# Patient Record
Sex: FEMALE | Race: WHITE | Employment: UNEMPLOYED | URBAN - METROPOLITAN AREA
[De-identification: names, ages, dates, MRNs, and addresses within clinical notes are randomized per-mention and may not be internally consistent; named-entity substitution may affect disease eponyms.]

---

## 2017-01-04 ENCOUNTER — TREATMENT (OUTPATIENT)
Dept: PHYSICAL THERAPY | Age: 47
End: 2017-01-04

## 2017-01-11 ENCOUNTER — TREATMENT (OUTPATIENT)
Dept: PHYSICAL THERAPY | Age: 47
End: 2017-01-11

## 2017-01-20 ENCOUNTER — TREATMENT (OUTPATIENT)
Dept: PHYSICAL THERAPY | Age: 47
End: 2017-01-20

## 2017-01-25 ENCOUNTER — TREATMENT (OUTPATIENT)
Dept: PHYSICAL THERAPY | Age: 47
End: 2017-01-25

## 2017-02-01 ENCOUNTER — TREATMENT (OUTPATIENT)
Dept: PHYSICAL THERAPY | Age: 47
End: 2017-02-01

## 2017-02-08 ENCOUNTER — TREATMENT (OUTPATIENT)
Dept: PHYSICAL THERAPY | Age: 47
End: 2017-02-08

## 2017-02-15 ENCOUNTER — TREATMENT (OUTPATIENT)
Dept: PHYSICAL THERAPY | Age: 47
End: 2017-02-15

## 2017-02-22 ENCOUNTER — TREATMENT (OUTPATIENT)
Dept: PHYSICAL THERAPY | Age: 47
End: 2017-02-22

## 2017-03-01 ENCOUNTER — TREATMENT (OUTPATIENT)
Dept: PHYSICAL THERAPY | Age: 47
End: 2017-03-01

## 2017-03-08 ENCOUNTER — TREATMENT (OUTPATIENT)
Dept: PHYSICAL THERAPY | Age: 47
End: 2017-03-08

## 2017-03-15 ENCOUNTER — TREATMENT (OUTPATIENT)
Dept: PHYSICAL THERAPY | Age: 47
End: 2017-03-15

## 2017-03-22 ENCOUNTER — TREATMENT (OUTPATIENT)
Dept: PHYSICAL THERAPY | Age: 47
End: 2017-03-22

## 2017-03-29 ENCOUNTER — TREATMENT (OUTPATIENT)
Dept: PHYSICAL THERAPY | Age: 47
End: 2017-03-29

## 2017-04-12 ENCOUNTER — TREATMENT (OUTPATIENT)
Dept: PHYSICAL THERAPY | Age: 47
End: 2017-04-12

## 2017-04-21 ENCOUNTER — TREATMENT (OUTPATIENT)
Dept: PHYSICAL THERAPY | Age: 47
End: 2017-04-21

## 2017-04-26 ENCOUNTER — TREATMENT (OUTPATIENT)
Dept: PHYSICAL THERAPY | Age: 47
End: 2017-04-26

## 2017-05-03 ENCOUNTER — TREATMENT (OUTPATIENT)
Dept: PHYSICAL THERAPY | Age: 47
End: 2017-05-03

## 2017-05-12 ENCOUNTER — TREATMENT (OUTPATIENT)
Dept: PHYSICAL THERAPY | Age: 47
End: 2017-05-12

## 2017-05-19 ENCOUNTER — TREATMENT (OUTPATIENT)
Dept: PHYSICAL THERAPY | Age: 47
End: 2017-05-19

## 2017-05-24 ENCOUNTER — TREATMENT (OUTPATIENT)
Dept: PHYSICAL THERAPY | Age: 47
End: 2017-05-24

## 2017-05-31 ENCOUNTER — TREATMENT (OUTPATIENT)
Dept: PHYSICAL THERAPY | Age: 47
End: 2017-05-31

## 2017-06-14 ENCOUNTER — TREATMENT (OUTPATIENT)
Dept: PHYSICAL THERAPY | Age: 47
End: 2017-06-14

## 2017-06-21 ENCOUNTER — TREATMENT (OUTPATIENT)
Dept: PHYSICAL THERAPY | Age: 47
End: 2017-06-21

## 2017-06-30 ENCOUNTER — TREATMENT (OUTPATIENT)
Dept: PHYSICAL THERAPY | Age: 47
End: 2017-06-30

## 2017-07-05 ENCOUNTER — TREATMENT (OUTPATIENT)
Dept: PHYSICAL THERAPY | Age: 47
End: 2017-07-05

## 2017-07-12 ENCOUNTER — TREATMENT (OUTPATIENT)
Dept: PHYSICAL THERAPY | Age: 47
End: 2017-07-12

## 2017-07-21 ENCOUNTER — TREATMENT (OUTPATIENT)
Dept: PHYSICAL THERAPY | Age: 47
End: 2017-07-21

## 2017-07-28 ENCOUNTER — TREATMENT (OUTPATIENT)
Dept: PHYSICAL THERAPY | Age: 47
End: 2017-07-28

## 2017-08-02 ENCOUNTER — TREATMENT (OUTPATIENT)
Dept: PHYSICAL THERAPY | Age: 47
End: 2017-08-02

## 2017-08-09 ENCOUNTER — TREATMENT (OUTPATIENT)
Dept: PHYSICAL THERAPY | Age: 47
End: 2017-08-09

## 2017-08-15 ENCOUNTER — TREATMENT (OUTPATIENT)
Dept: PHYSICAL THERAPY | Age: 47
End: 2017-08-15

## 2017-08-23 ENCOUNTER — TREATMENT (OUTPATIENT)
Dept: PHYSICAL THERAPY | Age: 47
End: 2017-08-23

## 2017-08-30 ENCOUNTER — TREATMENT (OUTPATIENT)
Dept: PHYSICAL THERAPY | Age: 47
End: 2017-08-30

## 2017-09-06 ENCOUNTER — TREATMENT (OUTPATIENT)
Dept: PHYSICAL THERAPY | Age: 47
End: 2017-09-06

## 2017-09-13 ENCOUNTER — TREATMENT (OUTPATIENT)
Dept: PHYSICAL THERAPY | Age: 47
End: 2017-09-13

## 2017-09-22 ENCOUNTER — TREATMENT (OUTPATIENT)
Dept: PHYSICAL THERAPY | Age: 47
End: 2017-09-22

## 2017-09-27 ENCOUNTER — TREATMENT (OUTPATIENT)
Dept: PHYSICAL THERAPY | Age: 47
End: 2017-09-27

## 2017-10-04 ENCOUNTER — TREATMENT (OUTPATIENT)
Dept: PHYSICAL THERAPY | Age: 47
End: 2017-10-04

## 2017-10-04 NOTE — FLOWSHEET NOTE
[address]  Phone: [phone]  Fax [phone]      Date:  10/4/2017    Patient Name:  Bereket Flood    :  1970  MRN: [id number]  Restrictions/Precautions:    Medical/Treatment Diagnosis Information:  ·  Hist LBP, froz shldr     Physician Information:       Patient is Post-Op [] Yes   [] No     DOS:           10/4/17         Subjective Feeling stronger each day no pain complaints with my back or shoulder                   Weeks Post-Op                    Objective Still working on trunk stab, good ham flexibility, gastroc soleus flexibility rated as good Tightness noted in quad both legs     Tightness noted in quad both legs  Still working on trunk stab, good ham flexibility Tightness noted in quad both legs  Trunk stab improvements noted, hamstring weakness noted   L 4+ R 5- Still working on trunk stab, good ham flexibility,              Goals Pilates conditioning for spine stab and maintenance Pilates conditioning for spine stab and maintenance Pilates conditioning for spine stab and maintenance Pilates conditioning for spine stab and maintenance Pilates conditioning for spine stab and maintenance Pilates conditioning for spine stab and maintenance Pilates conditioning for spine stab and maintenance             Reformer Exercises 2R1G walks, raises w/U str  2R1G U sq w/domenica  2R1G plie 3R walks, raises w/U str  3R U sq w/domenica  3R plie 3R walks, raises w/U str  3R U sq w/domenica  3R plie    3R walks, raises w/U str  3R U sq w/domenica  3R plie 2R1G walks, raises w/U str  2R1G U sq w/domenica  2R1G plie 3R walks, raises w/U str  3R U sq w/domenica  3R plie 2R1G walks, raises w/U str  2R1G U sq w/domenica  2R1G plie   Pelvic Stabilization   1R^1B #6 30 1R^1Y #5 30 1R^1Y #5 30 1R^1Y #5  1R1B #6                    2R 1 arm stand leg press 1R1B 1 arm stand leg press 1R1B 1 arm stand leg press             Trunk Stabilization 1R1B 90/90, leg

## 2017-10-11 ENCOUNTER — TREATMENT (OUTPATIENT)
Dept: PHYSICAL THERAPY | Age: 47
End: 2017-10-11

## 2017-10-11 NOTE — FLOWSHEET NOTE
[address]  Phone: [phone]  Fax [phone]      Date:  10/11/2017    Patient Name:  Jaclyn Sorenson    :  1970  MRN: [id number]  Restrictions/Precautions:    Medical/Treatment Diagnosis Information:  ·  Hist LBP, froz shldr     Physician Information:       Patient is Post-Op [] Yes   [] No     DOS:           10/4/17 10/11/17        Subjective Feeling stronger each day no pain complaints with my back or shoulder Feeling real good                  Weeks Post-Op                    Objective Still working on trunk stab, good ham flexibility, gastroc soleus flexibility rated as good Improvements noted in trunk stab and hip disassociation    Tightness noted in quad both legs  Still working on trunk stab, good ham flexibility Tightness noted in quad both legs  Trunk stab improvements noted, hamstring weakness noted   L 4+ R 5- Still working on trunk stab, good ham flexibility,              Goals Pilates conditioning for spine stab and maintenance Pilates conditioning for spine stab and maintenance Pilates conditioning for spine stab and maintenance Pilates conditioning for spine stab and maintenance Pilates conditioning for spine stab and maintenance Pilates conditioning for spine stab and maintenance Pilates conditioning for spine stab and maintenance             Reformer Exercises 2R1G walks, raises w/U str  2R1G U sq w/domenica  2R1G plie 3R walks, raises w/U str  3R U sq w/domenica  3R plie 3R walks, raises w/U str  3R U sq w/domenica  3R plie    3R walks, raises w/U str  3R U sq w/domenica  3R plie 2R1G walks, raises w/U str  2R1G U sq w/domenica  2R1G plie 3R walks, raises w/U str  3R U sq w/domenica  3R plie 2R1G walks, raises w/U str  2R1G U sq w/domenica  2R1G plie   Pelvic Stabilization   1R^1B #6 30 1R^1Y #5 30 1R^1Y #5  1R^1Y #5  1R1B #6                    2R 1 arm stand leg press 1R1B 1 arm stand leg press 1R1B 1 arm stand leg press Trunk Stabilization 1R1B 90/90, leg lifts, scissor, knee ext  1R1B flex, obl 2R 90/90, leg lifts, scissor, knee ext  1R1B flex, obl 1R1B 90/90, leg lifts, scissor, knee ext  1R1B flex, obl    1R1B 90/90, leg lifts, scissor, knee ext  1R1B flex, obl 1R1B 90/90, leg lifts, scissor, knee ext  1R1B flex, obl 1R1B 90/90, leg lifts, scissor, knee ext  1R1B flex, obl 1R1B 90/90, leg lifts, scissor, knee ext  1R1B flex, obl                                 Hip Disassociation 2R1B ll,v,[place],ir/er,frog 2R1B ll,v,[place],ir/er,frog, U w/abd 2R1B ll,v,[place],ir/er,frog 2R1B ll,v,[place],ir/er,frog 2R1B ll,v,[place],ir/er,frog, U w/abd 2R1B ll,v,[place],ir/er,frog, U w/abd 2R1B ll,v,[place],ir/er,frog, U w/abd              1R1B stand leg press 1 arm 1R1B stand leg press 1 arm                  Scapular Stabilization 1R1B #6 1R1B #6 1R1B #5 1R1B #5          Seated: low row, shldr ext Seated:2R1B row, biceps          1R1B RC, ext               Thoracic Mobility 2R Bridge from heels  2R Bridge from toes 2R Bridge from heels  2R Bridge from toes 2R Bridge from heels w/ext  2R Bridge from toes w/ext 2R Bridge from heels w/ext  2R Bridge from toes w/ext 2R Bridge from heels w/ext  2R Bridge from toes w/ext 2R Bridge from heels w/ext  2R Bridge from toes w/ext 2R Bridge from heels w/ext  2R Bridge from toes w/ext                                 General ROM Hams,hip add,pirif,lunge, gastroc, soleus Hams,hip add,pirif,lunge Hams,hip add,pirif,lunge Hams,hip add,pirif,lunge   Standing quad stretch    Hams,hip add,pirif,lunge   Standing quad stretch     Hams,hip add,pirif,lunge   Standing quad stretch      1R mermaid         Standing quad stretch  1R mermaid                         Other   HEP Bridge, monster walks, wall sq,  standing quad str HEP Bridge, monster walks, wall sq,  standing quad str                                    Summary/Comments                                           Electronically signed by:  Anastasiia Winters, ATC

## 2017-10-20 ENCOUNTER — TREATMENT (OUTPATIENT)
Dept: PHYSICAL THERAPY | Age: 47
End: 2017-10-20

## 2017-10-20 NOTE — FLOWSHEET NOTE
[address]  Phone: [phone]  Fax [phone]      Date:  10/20/2017    Patient Name:  Susan Kramer    :  1970  MRN: [id number]  Restrictions/Precautions:    Medical/Treatment Diagnosis Information:  ·  Hist LBP, froz shldr     Physician Information:       Patient is Post-Op [] Yes   [] No     DOS:           10/4/17 10/11/17 10/20/17       Subjective Feeling stronger each day no pain complaints with my back or shoulder Feeling real good Did some hiking and it bothered the top of my knee.                   Weeks Post-Op                    Objective Still working on trunk stab, good ham flexibility, gastroc soleus flexibility rated as good Improvements noted in trunk stab and hip disassociation    Tightness noted in quad both legs  Still working on trunk stab, good ham flexibility Tightness noted in quad both legs  Trunk stab improvements noted, hamstring weakness noted   L 4+ R 5- Still working on trunk stab, good ham flexibility,              Goals Pilates conditioning for spine stab and maintenance Pilates conditioning for spine stab and maintenance Pilates conditioning for spine stab and maintenance Pilates conditioning for spine stab and maintenance Pilates conditioning for spine stab and maintenance Pilates conditioning for spine stab and maintenance Pilates conditioning for spine stab and maintenance             Reformer Exercises 2R1G walks, raises w/U str  2R1G U sq w/domenica  2R1G plie 3R walks, raises w/U str  3R U sq w/domenica  3R plie 3R walks, raises w/U str  3R U sq w/domenica  3R plie    3R walks, raises w/U str  3R U sq w/domenica  3R plie 2R1G walks, raises w/U str  2R1G U sq w/domenica  2R1G plie 3R walks, raises w/U str  3R U sq w/domenica  3R plie 2R1G walks, raises w/U str  2R1G U sq w/domenica  2R1G plie   Pelvic Stabilization   1R^1B #6  1R^1Y #5  1R^1Y #5  1R^1Y #5  1R1B #6                    2R 1 arm stand leg

## 2017-10-25 ENCOUNTER — TREATMENT (OUTPATIENT)
Dept: PHYSICAL THERAPY | Age: 47
End: 2017-10-25

## 2017-11-01 ENCOUNTER — TREATMENT (OUTPATIENT)
Dept: PHYSICAL THERAPY | Age: 47
End: 2017-11-01

## 2017-11-01 NOTE — FLOWSHEET NOTE
[address]  Phone: [phone]  Fax [phone]      Date:  2017    Patient Name:  Alin Benitez    :  1970  MRN: [id number]  Restrictions/Precautions:    Medical/Treatment Diagnosis Information:  ·  Hist LBP, froz shldr     Physician Information:       Patient is Post-Op [] Yes   [] No     DOS:           10/4/17 10/11/17 10/20/17 10/25/17 11/1/17     Subjective Feeling stronger each day no pain complaints with my back or shoulder Feeling real good Did some hiking and it bothered the top of my knee.   Doing really well, feeling better, no knee complaints Doing really well               Weeks Post-Op                    Objective Still working on trunk stab, good ham flexibility, gastroc soleus flexibility rated as good Improvements noted in trunk stab and hip disassociation    Tightness noted in quad both legs  Still working on trunk stab, good ham flexibility Tightness noted in quad both legs  Trunk stab improvements noted, hamstring weakness noted   L 4+ R 5- Still working on trunk stab, good ham flexibility,              Goals Pilates conditioning for spine stab and maintenance Pilates conditioning for spine stab and maintenance Pilates conditioning for spine stab and maintenance Pilates conditioning for spine stab and maintenance Pilates conditioning for spine stab and maintenance Pilates conditioning for spine stab and maintenance Pilates conditioning for spine stab and maintenance             Reformer Exercises 2R1G walks, raises w/U str  2R1G U sq w/domenica  2R1G plie 3R walks, raises w/U str  3R U sq w/domenica  3R plie 3R walks, raises w/U str  3R U sq w/domenica  3R plie    2R1G walks, raises w/U str  2R1G U sq w/domenica  3R plie 2R1G walks, raises w/U str  2R1G U sq w/domenica  2R1G plie 3R walks, raises w/U str  3R U sq w/domenica  3R plie 2R1G walks, raises w/U str  2R1G U sq w/domenica  2R1G plie   Pelvic Stabilization   1R^1B #6  1R^1Y #5 standing quad str                                    Summary/Comments                                           Electronically signed by:  Mal Gonzales, ATC

## 2017-11-08 ENCOUNTER — TREATMENT (OUTPATIENT)
Dept: PHYSICAL THERAPY | Age: 47
End: 2017-11-08

## 2017-11-08 NOTE — FLOWSHEET NOTE
[address]  Phone: [phone]  Fax [phone]      Date:  2017    Patient Name:  Alin Benitez    :  1970  MRN: [id number]  Restrictions/Precautions:    Medical/Treatment Diagnosis Information:  ·  Hist LBP, froz shldr     Physician Information:       Patient is Post-Op [] Yes   [] No     DOS:           10/4/17 10/11/17 10/20/17 10/25/17 11/1/17 11/8/17    Subjective Feeling stronger each day no pain complaints with my back or shoulder Feeling real good Did some hiking and it bothered the top of my knee.   Doing really well, feeling better, no knee complaints Doing really well Feel real good today              Weeks Post-Op                    Objective Still working on trunk stab, good ham flexibility, gastroc soleus flexibility rated as good Improvements noted in trunk stab and hip disassociation    Tightness noted in quad both legs  Still working on trunk stab, good ham flexibility Tightness noted in quad both legs  Trunk stab improvements noted, hamstring weakness noted   L 4+ R 5- Still working on trunk stab, good ham flexibility,              Goals Pilates conditioning for spine stab and maintenance Pilates conditioning for spine stab and maintenance Pilates conditioning for spine stab and maintenance Pilates conditioning for spine stab and maintenance Pilates conditioning for spine stab and maintenance Pilates conditioning for spine stab and maintenance Pilates conditioning for spine stab and maintenance             Reformer Exercises 2R1G walks, raises w/U str  2R1G U sq w/domenica  2R1G plie 3R walks, raises w/U str  3R U sq w/domeniac  3R plie 3R walks, raises w/U str  3R U sq w/domenica  3R plie    2R1G walks, raises w/U str  2R1G U sq w/domenica  3R plie 2R1G walks, raises w/U str  2R1G U sq w/domenica  2R1G plie 3R walks, raises w/U str  3R U sq w/domenica  3R plie 2R1G walks, raises w/U str  2R1G U sq w/domenica  2R1G plie   Pelvic Stabilization 1R^1B #6 20/20/30 1R^1Y #5 20/20/30 1R^1Y #5 20/20/30 1R1B #5 20/20/30 1R1B #6                    2R 1 arm stand leg press 1R1B 1 arm stand leg press 1R1B 1 arm stand leg press             Trunk Stabilization 1R1B 90/90, leg lifts, scissor, knee ext  1R1B flex, obl 2R 90/90, leg lifts, scissor, knee ext  1R1B flex, obl 1R1B 90/90, leg lifts, scissor, knee ext  1R1B flex, obl    1R1B 90/90, leg lifts, scissor, knee ext  1R1B flex, obl 1R1B 90/90, leg lifts, scissor, knee ext  1R1B flex, obl 1R1B 90/90, leg lifts, scissor, knee ext  1R1B flex, obl 1R1B 90/90, leg lifts, scissor, knee ext  1R1B flex, obl                                 Hip Disassociation 2R1B ll,v,[place],ir/er,frog 2R1B ll,v,[place],ir/er,frog, U w/abd 2R1B ll,v,[place],ir/er,frog 2R1B ll,v,[place],ir/er,frog 2R1B ll,v,[place],ir/er,frog, U w/abd 2R1B ll,v,[place],ir/er,frog, U w/abd 2R1B ll,v,[place],ir/er,frog, U w/abd              1R1B stand leg press 1 arm 1R1B stand leg press 1 arm                  Scapular Stabilization 1R1B #6 1R1B #6 1R1B #5 1R1B #5          Seated: low row, shldr ext Seated:2R1B row, biceps          1R1B RC, ext               Thoracic Mobility 2R Bridge from heels  2R Bridge from toes 2R Bridge from heels  2R Bridge from toes 2R Bridge from heels w/ext  2R Bridge from toes w/ext 2R Bridge from heels w/ext  2R Bridge from toes w/ext 2R Bridge from heels w/ext  2R Bridge from toes w/ext 2R Bridge from heels w/ext  2R Bridge from toes w/ext 2R Bridge from heels w/ext  2R Bridge from toes w/ext                                 General ROM Hams,hip add,pirif,lunge, gastroc, soleus Hams,hip add,pirif,lunge Hams,hip add,pirif,lunge Hams,hip add,pirif,lunge   Standing quad stretch    Hams,hip add,pirif,lunge   Standing quad stretch     Hams,hip add,pirif,lunge   Standing quad stretch      1R mermaid         Standing quad stretch  1R mermaid                         Other   HEP Bridge, monster walks, wall sq,  standing quad str HEP Bridge, monster walks, wall sq,  standing quad str                                    Summary/Comments                                           Electronically signed by:  Darryl Beauchamp, ATC

## 2017-11-17 ENCOUNTER — TREATMENT (OUTPATIENT)
Dept: PHYSICAL THERAPY | Age: 47
End: 2017-11-17

## 2017-11-17 NOTE — FLOWSHEET NOTE
quad str HEP Bridge, monster walks, wall sq,  standing quad str                                    Summary/Comments                                           Electronically signed by:  Pilar Duarte ATC

## 2017-11-22 ENCOUNTER — TREATMENT (OUTPATIENT)
Dept: PHYSICAL THERAPY | Age: 47
End: 2017-11-22

## 2017-11-22 NOTE — FLOWSHEET NOTE
[address]  Phone: [phone]  Fax [phone]      Date:  2017    Patient Name:  Ramón Ford    :  1970  MRN: [id number]  Restrictions/Precautions:    Medical/Treatment Diagnosis Information:  ·  Hist LBP, froz shldr     Physician Information:       Patient is Post-Op [] Yes   [] No     DOS:           17         Subjective Feeling good no complaints                   Weeks Post-Op                    Objective Still working on trunk stab, good ham flexibility, gastroc soleus flexibility rated as good Improvements noted in trunk stab and hip disassociation    Tightness noted in quad both legs  Still working on trunk stab, good ham flexibility Tightness noted in quad both legs  Trunk stab improvements noted, hamstring weakness noted   L 4+ R 5- Still working on trunk stab, good ham flexibility,              Goals Pilates conditioning for spine stab and maintenance Pilates conditioning for spine stab and maintenance Pilates conditioning for spine stab and maintenance Pilates conditioning for spine stab and maintenance Pilates conditioning for spine stab and maintenance Pilates conditioning for spine stab and maintenance Pilates conditioning for spine stab and maintenance             Reformer Exercises 2R1G walks, raises w/U str  2R1G U sq w/domenica  2R1G plie 3R walks, raises w/U str  3R U sq w/domenica  3R plie 3R walks, raises w/U str  3R U sq w/domenica  3R plie    2R1G walks, raises w/U str  2R1G U sq w/domenica  3R plie 2R1G walks, raises w/U str  2R1G U sq w/domenica  2R1G plie 3R walks, raises w/U str  3R U sq w/domenica  3R plie 2R1G walks, raises w/U str  2R1G U sq w/domenica 40/40  2R1G plie   Pelvic Stabilization   1R^1B #6 /20/30 1R^1Y #5 20/20/30 1R^1Y #5 /20/30 1R1B #5 /2030 1R1B #6                    2R 1 arm stand leg press 1R1B 1 arm stand leg press 2R 1 arm stand leg press             Trunk Stabilization 1R1B 90/90, leg lifts,

## 2017-11-29 ENCOUNTER — TREATMENT (OUTPATIENT)
Dept: PHYSICAL THERAPY | Age: 47
End: 2017-11-29

## 2017-11-29 NOTE — FLOWSHEET NOTE
press 2R 1 arm stand leg press             Trunk Stabilization 1R1B 90/90, leg lifts, scissor, knee ext  1R1B flex, obl 2R 90/90, leg lifts, scissor, knee ext  1R1B flex, obl 1R1B 90/90, leg lifts, scissor, knee ext  1R1B flex, obl    1R1B 90/90, leg lifts, scissor, knee ext  1R1B flex, obl 1R1B 90/90, leg lifts, scissor, knee ext  1R1B flex, obl 1R1B 90/90, leg lifts, scissor, knee ext  1R1B flex, obl 1R1B 90/90, leg lifts, scissor, knee ext  1R1B flex, obl                                 Hip Disassociation 2R1B ll,v,[place],ir/er,frog 2R1B ll,v,[place],ir/er,frog, U w/abd 2R1B ll,v,[place],ir/er,frog 2R1B ll,v,[place],ir/er,frog 2R1B ll,v,[place],ir/er,frog, U w/abd 2R1B ll,v,[place],ir/er,frog, U w/abd 2R1B ll,v,[place],ir/er,frog, U w/abd              1R1B stand leg press 1 arm 1R1B stand leg press 1 arm                  Scapular Stabilization 1R1B #6 1R1B #6 1R1B #5 1R1B #5          Seated: low row, shldr ext Seated:2R1B row, biceps          1R1B RC, ext               Thoracic Mobility 2R Bridge from heels  2R Bridge from toes 2R Bridge from heels  2R Bridge from toes 2R Bridge from heels w/ext  2R Bridge from toes w/ext 2R Bridge from heels w/ext  2R Bridge from toes w/ext 2R Bridge from heels w/ext  2R Bridge from toes w/ext 2R Bridge from heels w/ext  2R Bridge from toes w/ext 2R Bridge from heels w/ext  2R Bridge from toes w/ext                                 General ROM Hams,hip add,pirif,lunge, gastroc, soleus Hams,hip add,pirif,lunge Hams,hip add,pirif,lunge Hams,hip add,pirif,lunge   Standing quad stretch    Hams,hip add,pirif,lunge   Standing quad stretch     Hams,hip add,pirif,lunge   Standing quad stretch      1R mermaid         Standing quad stretch  1R mermaid                         Other   HEP Bridge, monster walks, wall sq,  standing quad str HEP Bridge, monster walks, wall sq,  standing quad str                                    Summary/Comments Electronically signed by:  Prosper Johnson, ATC

## 2017-12-06 ENCOUNTER — TREATMENT (OUTPATIENT)
Dept: PHYSICAL THERAPY | Age: 47
End: 2017-12-06

## 2017-12-06 NOTE — FLOWSHEET NOTE
[address], [address] [place] [address]  Phone: [phone]  Fax [phone]      Date:  2017    Patient Name:  Ramón Ford    :  1970  MRN: [id number]  Restrictions/Precautions:    Medical/Treatment Diagnosis Information:  ·  Hist LBP, froz shldr     Physician Information:       Patient is Post-Op [] Yes   [] No     DOS:           17       Subjective Feeling good no complaints Doing well, have a small bruise on my upper calf close to my knee but it doesn't hurt Feel really good no complaints                 Weeks Post-Op                    Objective Still working on trunk stab, good ham flexibility, gastroc soleus flexibility rated as good Improvements noted in trunk stab and hip disassociation    Tightness noted in quad both legs  Still working on trunk stab, good ham flexibility Tightness noted in quad both legs  Trunk stab improvements noted, hamstring weakness noted   L 4+ R 5- Still working on trunk stab, good ham flexibility,              Goals Pilates conditioning for spine stab and maintenance Pilates conditioning for spine stab and maintenance Pilates conditioning for spine stab and maintenance Pilates conditioning for spine stab and maintenance Pilates conditioning for spine stab and maintenance Pilates conditioning for spine stab and maintenance Pilates conditioning for spine stab and maintenance             Reformer Exercises 2R1G walks, raises w/U str  2R1G U sq w/domenica  2R1G plie 3R walks, raises w/U str  3R U sq w/domenica  3R plie 3R walks, raises w/U str  3R U sq w/domenica  3R plie    2R1G walks, raises w/U str  2R1G U sq w/domenica  3R plie 2R1G walks, raises w/U str  2R1G U sq w/domenica  2R1G plie 3R walks, raises w/U str  3R U sq w/domenica  3R plie 2R1G walks, raises w/U str  2R1G U sq w/domenica 40/40  2R1G plie   Pelvic Stabilization   1R^1B #6 30 1R^1Y #5 /30 1R^1Y #5 30 1R1B #5 30 1R1B #6                    2R 1 arm Electronically signed by:  Rosalinda Kelly, ATC

## 2017-12-13 ENCOUNTER — TREATMENT (OUTPATIENT)
Dept: PHYSICAL THERAPY | Age: 47
End: 2017-12-13

## 2017-12-13 NOTE — FLOWSHEET NOTE
[address]  Phone: [phone]  Fax [phone]      Date:  2017    Patient Name:  Rula Harris    :  1970  MRN: [id number]  Restrictions/Precautions:    Medical/Treatment Diagnosis Information:  ·  Hist LBP, froz shldr     Physician Information:       Patient is Post-Op [] Yes   [] No     DOS:           17      Subjective Feeling good no complaints Doing well, have a small bruise on my upper calf close to my knee but it doesn't hurt Feel really good no complaints Had a fall yesterday so I am just sore today but not too bad.                 Weeks Post-Op                    Objective Still working on trunk stab, good ham flexibility, gastroc soleus flexibility rated as good Improvements noted in trunk stab and hip disassociation    Tightness noted in quad both legs  Still working on trunk stab, good ham flexibility Tightness noted in quad both legs  Trunk stab improvements noted, hamstring weakness noted   L 4+ R 5- Still working on trunk stab, good ham flexibility,              Goals Pilates conditioning for spine stab and maintenance Pilates conditioning for spine stab and maintenance Pilates conditioning for spine stab and maintenance Pilates conditioning for spine stab and maintenance Pilates conditioning for spine stab and maintenance Pilates conditioning for spine stab and maintenance Pilates conditioning for spine stab and maintenance             Reformer Exercises 2R1G walks, raises w/U str  2R1G U sq w/domenica  2R1G plie 3R walks, raises w/U str  3R U sq w/domenica  3R plie 3R walks, raises w/U str  3R U sq w/domenica  3R plie    2R1G walks, raises w/U str  2R1G U sq w/domenica  3R plie 2R1G walks, raises w/U str  2R1G U sq w/domenica  2R1G plie 3R walks, raises w/U str  3R U sq w/domenica  3R plie 2R1G walks, raises w/U str  2R1G U sq w/domenica 40/40  2R1G plie   Pelvic Stabilization   1R^1B #6  1R^1Y #5  1R^1Y #5 20/20/30 1R1B #5 20/20/30 1R1B #6                    2R 1 arm stand leg press 1R1B 1 arm stand leg press 2R 1 arm stand leg press             Trunk Stabilization 1R1B 90/90, leg lifts, scissor, knee ext  1R1B flex, obl 2R 90/90, leg lifts, scissor, knee ext  1R1B flex, obl 1R1B 90/90, leg lifts, scissor, knee ext  1R1B flex, obl    1R1B 90/90, leg lifts, scissor, knee ext  1R1B flex, obl 1R1B 90/90, leg lifts, scissor, knee ext  1R1B flex, obl 1R1B 90/90, leg lifts, scissor, knee ext  1R1B flex, obl 1R1B 90/90, leg lifts, scissor, knee ext  1R1B flex, obl                                 Hip Disassociation 2R1B ll,v,[place],ir/er,frog 2R1B ll,v,[place],ir/er,frog, U w/abd 2R1B ll,v,[place],ir/er,frog 2R1B ll,v,[place],ir/er,frog 2R1B ll,v,[place],ir/er,frog, U w/abd 2R1B ll,v,[place],ir/er,frog, U w/abd 2R1B ll,v,[place],ir/er,frog, U w/abd              1R1B stand leg press 1 arm 1R1B stand leg press 1 arm                  Scapular Stabilization 1R1B #6 1R1B #6 1R1B #5 1R1B #5          Seated: low row, shldr ext Seated:2R1B row, biceps          1R1B RC, ext               Thoracic Mobility 2R Bridge from heels  2R Bridge from toes 2R Bridge from heels  2R Bridge from toes 2R Bridge from heels w/ext  2R Bridge from toes w/ext 2R Bridge from heels w/ext  2R Bridge from toes w/ext 2R Bridge from heels w/ext  2R Bridge from toes w/ext 2R Bridge from heels w/ext  2R Bridge from toes w/ext 2R Bridge from heels w/ext  2R Bridge from toes w/ext                                 General ROM Hams,hip add,pirif,lunge, gastroc, soleus Hams,hip add,pirif,lunge Hams,hip add,pirif,lunge Hams,hip add,pirif,lunge   Standing quad stretch    Hams,hip add,pirif,lunge   Standing quad stretch     Hams,hip add,pirif,lunge   Standing quad stretch      1R mermaid         Standing quad stretch  1R mermaid                         Other   HEP Bridge, monster walks, wall sq,  standing quad str HEP Bridge, monster walks, wall sq,  standing quad str                                    Summary/Comments                                           Electronically signed by:  Pilar Duarte ATC

## 2017-12-22 ENCOUNTER — TREATMENT (OUTPATIENT)
Dept: PHYSICAL THERAPY | Age: 47
End: 2017-12-22

## 2017-12-22 NOTE — FLOWSHEET NOTE
20/20/30 1R^1Y #5 20/20/30 1R^1Y #5 20/20/30 1R1B #5 20/20/30 1R1B #6                    2R 1 arm stand leg press 1R1B 1 arm stand leg press 2R 1 arm stand leg press             Trunk Stabilization 1R1B 90/90, leg lifts, scissor, knee ext  1R1B flex, obl 2R 90/90, leg lifts, scissor, knee ext  1R1B flex, obl 1R1B 90/90, leg lifts, scissor, knee ext  1R1B flex, obl    1R1B 90/90, leg lifts, scissor, knee ext  1R1B flex, obl 1R1B 90/90, leg lifts, scissor, knee ext  1R1B flex, obl 1R1B 90/90, leg lifts, scissor, knee ext  1R1B flex, obl 1R1B 90/90, leg lifts, scissor, knee ext  1R1B flex, obl                                 Hip Disassociation 2R1B ll,v,[place],ir/er,frog 2R1B ll,v,[place],ir/er,frog, U w/abd 2R1B ll,v,[place],ir/er,frog 2R1B ll,v,[place],ir/er,frog 2R1B ll,v,[place],ir/er,frog, U w/abd 2R1B ll,v,[place],ir/er,frog, U w/abd 2R1B ll,v,[place],ir/er,frog, U w/abd              1R1B stand leg press 1 arm 1R1B stand leg press 1 arm                  Scapular Stabilization 1R1B #6 1R1B #6 1R1B #5 1R1B #5  1R1B #6  #7        Seated: low row, shldr ext Seated:2R1B row, biceps          1R1B RC, ext               Thoracic Mobility 2R Bridge from heels  2R Bridge from toes 2R Bridge from heels  2R Bridge from toes 2R Bridge from heels w/ext  2R Bridge from toes w/ext 2R Bridge from heels w/ext  2R Bridge from toes w/ext 2R Bridge from heels w/ext  2R Bridge from toes w/ext 2R Bridge from heels w/ext  2R Bridge U w/domenica 2R Bridge from heels w/ext  2R Bridge from toes w/ext                   1R box: prone ext              General ROM Hams,hip add,pirif,lunge, gastroc, soleus Hams,hip add,pirif,lunge Hams,hip add,pirif,lunge Hams,hip add,pirif,lunge   Standing quad stretch    Hams,hip add,pirif,lunge   Standing quad stretch    Hams,hip add,pirif,lunge   Standing quad stretch    Hams,hip add,pirif,lunge   Standing quad stretch      1R mermaid         Standing quad stretch  1R mermaid 1R mermaid                        Other HEP Bridge, monster walks, wall sq,  standing quad str HEP Bridge, monster walks, wall sq,  standing quad str                                    Summary/Comments                                           Electronically signed by:  Giacomo Higginbotham ATC

## 2017-12-27 ENCOUNTER — TREATMENT (OUTPATIENT)
Dept: PHYSICAL THERAPY | Age: 47
End: 2017-12-27

## 2017-12-27 NOTE — FLOWSHEET NOTE
20/20/30 1R^1Y #5 20/20/30 1R^1Y #5 20/20/30 1R1B #5 20/20/30 1R1B #6                    2R 1 arm stand leg press 1R1B 1 arm stand leg press 2R 1 arm stand leg press             Trunk Stabilization 1R1B 90/90, leg lifts, scissor, knee ext  1R1B flex, obl 2R 90/90, leg lifts, scissor, knee ext  1R1B flex, obl 1R1B 90/90, leg lifts, scissor, knee ext  1R1B flex, obl    1R1B 90/90, leg lifts, scissor, knee ext  1R1B flex, obl 1R1B 90/90, leg lifts, scissor, knee ext  1R1B flex, obl 1R1B 90/90, leg lifts, scissor, knee ext  1R1B flex, obl 1R1B 90/90, leg lifts, scissor, knee ext  1R1B flex, obl                                 Hip Disassociation 2R1B ll,v,[place],ir/er,frog 2R1B ll,v,[place],ir/er,frog, U w/abd 2R1B ll,v,[place],ir/er,frog 2R1B ll,v,[place],ir/er,frog 2R1B ll,v,[place],ir/er,frog, U w/abd 2R1B ll,v,[place],ir/er,frog, U w/abd 2R1B ll,v,[place],ir/er,frog, U w/abd              1R1B stand leg press 1 arm 1R1B stand leg press 1 arm                  Scapular Stabilization 1R1B #6 1R1B #6 1R1B #5 1R1B #5  1R1B #6  #7        Seated: low row, shldr ext Seated:2R1B row, biceps          1R1B RC, ext               Thoracic Mobility 2R Bridge from heels  2R Bridge from toes 2R Bridge from heels  2R Bridge from toes 2R Bridge from heels w/ext  2R Bridge from toes w/ext 2R Bridge from heels w/ext  2R Bridge from toes w/ext 2R Bridge from heels w/ext  2R Bridge from toes w/ext 2R Bridge from heels w/ext  2R Bridge U w/domenica 2R Bridge from heels w/ext  2R Bridge from toes w/ext                   1R box: prone ext              General ROM Hams,hip add,pirif,lunge, gastroc, soleus Hams,hip add,pirif,lunge Hams,hip add,pirif,lunge Hams,hip add,pirif,lunge   Standing quad stretch    Hams,hip add,pirif,lunge   Standing quad stretch    Hams,hip add,pirif,lunge   Standing quad stretch    Hams,hip add,pirif,lunge   Standing quad stretch      1R mermaid         Standing quad stretch  1R mermaid 1R mermaid                        Other

## 2018-01-04 ENCOUNTER — TREATMENT (OUTPATIENT)
Dept: PHYSICAL THERAPY | Age: 48
End: 2018-01-04

## 2018-01-04 NOTE — FLOWSHEET NOTE
Other   HEP Bridge, monster walks, wall sq,  standing quad str HEP Bridge, monster walks, wall sq,  standing quad str                                    Summary/Comments                                           Electronically signed by:  Jeromy Bishop ATC

## 2018-01-10 ENCOUNTER — TREATMENT (OUTPATIENT)
Dept: PHYSICAL THERAPY | Age: 48
End: 2018-01-10

## 2018-01-19 ENCOUNTER — TREATMENT (OUTPATIENT)
Dept: PHYSICAL THERAPY | Age: 48
End: 2018-01-19

## 2018-01-19 NOTE — FLOWSHEET NOTE
[address]  Phone: [phone]  Fax [phone]      Date:  2018    Patient Name:  Johanna Dukes    :  1970  MRN: [id number]  Restrictions/Precautions:    Medical/Treatment Diagnosis Information:  ·  Hist LBP, froz shldr     Physician Information:       Patient is Post-Op [] Yes   [] No     DOS:           1/10/18 1/19/18        Subjective Doing really well Feel good just havent been able to get my regular walks in due to the weather                  Weeks Post-Op                    Objective Still working on trunk stab, good ham flexibility, gastroc soleus flexibility rated as good Improvements noted in trunk stab and hip disassociation    Tightness noted in quad both legs  Still working on trunk stab, good ham flexibility Tightness noted in quads both legs  Trunk stab improvements noted Still working on trunk stab, good ham flexibility,              Goals Pilates conditioning for spine stab and maintenance Pilates conditioning for spine stab and maintenance Pilates conditioning for spine stab and maintenance Pilates conditioning for spine stab and maintenance Pilates conditioning for spine stab and maintenance Pilates conditioning for spine stab and maintenance Pilates conditioning for spine stab and maintenance             Reformer Exercises 2R1G walks, raises w/U str  2R1G U sq w/domenica  2R1G plie 3R walks, raises w/U str  3R U sq w/domenica  3R plie 3R walks, raises w/U str  3R U sq w/domenica  3R plie    2R1G walks, raises w/U str  2R1G U sq w/domenica  3R plie 2R1G walks, raises w/U str  2R1G U sq w/domenica  2R1G plie 3R walks, raises w/U str  3R U sq w/domenica  3R plie 2R1G walks, raises w/U str  2R1G U sq w/domenica 40/40  2R1G plie   Pelvic Stabilization   1R^1B #6 20/20/30 1R^1Y #5 20/20/30 1R^1Y #5 20/20/30 1R1B #5 /30 1R1B #6                    2R 1 arm stand leg press 1R1B 1 arm stand leg press 2R 1 arm stand leg press             Trunk Electronically signed by:  Christine Vaughan ATC

## 2018-01-24 ENCOUNTER — TREATMENT (OUTPATIENT)
Dept: PHYSICAL THERAPY | Age: 48
End: 2018-01-24

## 2018-01-31 ENCOUNTER — TREATMENT (OUTPATIENT)
Dept: PHYSICAL THERAPY | Age: 48
End: 2018-01-31

## 2018-01-31 NOTE — FLOWSHEET NOTE
[address]  Phone: [phone]  Fax [phone]      Date:  2018    Patient Name:  Enola Opitz    :  1970  MRN: [id number]  Restrictions/Precautions:    Medical/Treatment Diagnosis Information:  ·  Hist LBP, froz shldr     Physician Information:       Patient is Post-Op [] Yes   [] No     DOS:           1/10/18 1/19/18 1/24/18 1/31/18      Subjective Doing really well Feel good just havent been able to get my regular walks in due to the weather Doing really well Feeling good                Weeks Post-Op                    Objective Still working on trunk stab, good ham flexibility, gastroc soleus flexibility rated as good Improvements noted in trunk stab and hip disassociation    Tightness noted in quad both legs  Still working on trunk stab, good ham flexibility Tightness noted in quads both legs  Trunk stab improvements noted Still working on trunk stab, good ham flexibility,              Goals Pilates conditioning for spine stab and maintenance Pilates conditioning for spine stab and maintenance Pilates conditioning for spine stab and maintenance Pilates conditioning for spine stab and maintenance Pilates conditioning for spine stab and maintenance Pilates conditioning for spine stab and maintenance Pilates conditioning for spine stab and maintenance             Reformer Exercises 2R1G walks, raises w/U str  2R1G U sq w/domenica  2R1G plie 3R walks, raises w/U str  3R U sq w/domenica  3R plie 3R walks, raises w/U str  3R U sq w/domenica  3R plie    2R1G walks, raises w/U str  2R1G U sq w/domenica  3R plie 2R1G walks, raises w/U str  2R1G U sq w/domenica  2R1G plie 3R walks, raises w/U str  3R U sq w/domenica  3R plie 2R1G walks, raises w/U str  2R1G U sq w/domenica 40/40  2R1G plie   Pelvic Stabilization   1R^1B #6 20/20/30 1R^1Y #5 20/20/30 1R^1Y #5 20/20/30 1R1B #5 /20/30 1R1B #6                    2R 1 arm stand leg press 1R1B 1 arm stand leg press 2R 1 arm stand leg press             Trunk Stabilization 1R1B 90/90, leg lifts, scissor, knee ext  1R1B flex, obl 2R 90/90, leg lifts, scissor, knee ext  1R1B flex, obl 1R1B 90/90, leg lifts, scissor, knee ext  1R1B flex, obl    1R1B 90/90, leg lifts, scissor, knee ext  1R1B flex, obl 1R1B 90/90, leg lifts, scissor, knee ext  1R1B flex, obl 1R1B 90/90, leg lifts, scissor, knee ext  1R1B flex, obl 1R1B 90/90, leg lifts, scissor, knee ext  1R1B flex, obl                                 Hip Disassociation 2R1B ll,v,[place],ir/er,frog 2R1B ll,v,[place],ir/er,frog, U w/abd 2R1B ll,v,[place],ir/er,frog 2R1B ll,v,[place],ir/er,frog 2R1B ll,v,[place],ir/er,frog, U w/abd 2R1B ll,v,[place],ir/er,frog, U w/abd 2R1B ll,v,[place],ir/er,frog, U w/abd              1R1B stand leg press 1 arm 1R1B stand leg press 1 arm                  Scapular Stabilization 1R1B #6 1R1B #6 1R1B #5 1R1B #5  1R1B #6  #7 1R1B 6#       Seated: low row, shldr ext Seated:2R1B row, biceps          1R1B RC, ext               Thoracic Mobility 2R Bridge from heels  2R Bridge from toes 2R Bridge from heels  2R Bridge from toes 2R Bridge from heels w/ext  2R Bridge from toes w/ext 2R Bridge from heels w/ext  2R Bridge from toes w/ext 2R Bridge from heels w/ext  2R Bridge from toes w/ext 2R Bridge from heels w/ext  2R Bridge U w/domenica 2R Bridge from heels w/ext  2R Bridge from toes w/ext                   1R box: prone ext              General ROM Hams,hip add,pirif,lunge, gastroc, soleus Hams,hip add,pirif,lunge Hams,hip add,pirif,lunge Hams,hip add,pirif,lunge   Standing quad stretch    Hams,hip add,pirif,lunge   Standing quad stretch    Hams,hip add,pirif,lunge   Standing quad stretch    Hams,hip add,pirif,lunge   Standing quad stretch      1R mermaid         Standing quad stretch  1R mermaid 1R mermaid                        Other   HEP Bridge, monster walks, wall sq,  standing quad str HEP Bridge, monster walks, wall sq,  standing quad str Summary/Comments                                           Electronically signed by:  Donna Conrad, ATC

## 2018-02-08 ENCOUNTER — TREATMENT (OUTPATIENT)
Dept: PHYSICAL THERAPY | Age: 48
End: 2018-02-08

## 2018-02-14 ENCOUNTER — TREATMENT (OUTPATIENT)
Dept: PHYSICAL THERAPY | Age: 48
End: 2018-02-14

## 2018-02-14 NOTE — FLOWSHEET NOTE
20/20/30 1R1B #6 1R1B #6                   2R 1 arm stand leg press 1R1B 1 arm stand leg press 2R 1 arm stand leg press             Trunk Stabilization 1R1B 90/90, leg lifts, scissor, knee ext  1R1B flex, obl 2R 90/90, leg lifts, scissor, knee ext  1R1B flex, obl 1R1B 90/90, leg lifts, scissor, knee ext  1R1B flex, obl    1R1B 90/90, leg lifts, scissor, knee ext  1R1B flex, obl 1R1B 90/90, leg lifts, scissor, knee ext  1R1B flex, obl 1R1B 90/90, leg lifts, scissor, knee ext  1R1B flex, obl 1R1B 90/90, leg lifts, scissor, knee ext  1R1B flex, obl                                 Hip Disassociation 2R1B ll,v,[place],ir/er,frog 2R1B ll,v,[place],ir/er,frog, U w/abd 2R1B ll,v,[place],ir/er,frog 2R1B ll,v,[place],ir/er,frog 2R1B ll,v,[place],ir/er,frog, U w/abd 2R1G ll,v,[place],ir/er,frog, U w/abd 2R1B ll,v,[place],ir/er,frog, U w/abd              1R1B stand leg press 1 arm 1R1B stand leg press 1 arm                  Scapular Stabilization 1R1B #6 1R1B #6 1R1B #5 1R1B #5  1R1B #6  #7 1R1B 6#       Seated: low row, shldr ext Seated:2R1B row, biceps          1R1B RC, ext               Thoracic Mobility 2R Bridge from heels  2R Bridge from toes 2R Bridge from heels  2R Bridge from toes 2R Bridge from heels w/ext  2R Bridge from toes w/ext 2R Bridge from heels w/ext  2R Bridge from toes w/ext 2R Bridge from heels w/ext  2R Bridge from toes w/ext 2R Bridge from heels w/ext  2R Bridge U w/domenica 2R Bridge from heels w/ext  2R Bridge from toes w/ext                   1R box: prone ext              General ROM Hams,hip add,pirif,lunge, gastroc, soleus Hams,hip add,pirif,lunge Hams,hip add,pirif,lunge Hams,hip add,pirif,lunge   Standing quad stretch    Hams,hip add,pirif,lunge   Standing quad stretch    Hams,hip add,pirif,lunge   Standing quad stretch    Hams,hip add,pirif,lunge   Standing quad stretch      1R mermaid         Standing quad stretch  1R mermaid 1R mermaid                        Other   HEP Bridge, monster walks, wall sq, standing quad str HEP Bridge, monster walks, wall sq,  standing quad str                                    Summary/Comments                                           Electronically signed by:  Allison Hussein ATC

## 2018-02-21 ENCOUNTER — TREATMENT (OUTPATIENT)
Dept: PHYSICAL THERAPY | Age: 48
End: 2018-02-21

## 2018-02-21 NOTE — FLOWSHEET NOTE
#5 20/20/30 1R1B #5 20/20/30 1R1B #6 1R1B #6                   2R 1 arm stand leg press 1R1B 1 arm stand leg press 2R 1 arm stand leg press             Trunk Stabilization 1R1B 90/90, leg lifts, scissor, knee ext  1R1B flex, obl 2R 90/90, leg lifts, scissor, knee ext  1R1B flex, obl 1R1B 90/90, leg lifts, scissor, knee ext  1R1B flex, obl    1R1B 90/90, leg lifts, scissor, knee ext  1R1B flex, obl 1R1B 90/90, leg lifts, scissor, knee ext  1R1B flex, obl 1R1B 90/90, leg lifts, scissor, knee ext  1R1B flex, obl 1R1B 90/90, leg lifts, scissor, knee ext  1R1B flex, obl                                 Hip Disassociation 2R1B ll,v,[place],ir/er,frog 2R1B ll,v,[place],ir/er,frog, U w/abd 2R1B ll,v,[place],ir/er,frog 2R1B ll,v,[place],ir/er,frog 2R1B ll,v,[place],ir/er,frog, U w/abd 2R1G ll,v,[place],ir/er,frog, U w/abd 2R1B ll,v,[place],ir/er,frog, U w/abd              1R1B stand leg press 1 arm 1R1B stand leg press 1 arm                  Scapular Stabilization 1R1B #6 1R1B #6 1R1B #5 1R1B #5  1R1B #6  #7 1R1B 6#       Seated: low row, shldr ext Seated:2R1B row, biceps          1R1B RC, ext               Thoracic Mobility 2R Bridge from heels  2R Bridge from toes 2R Bridge from heels  2R Bridge from toes 2R Bridge from heels w/ext  2R Bridge from toes w/ext 2R Bridge from heels w/ext  2R Bridge from toes w/ext 2R Bridge from heels w/ext  2R Bridge from toes w/ext 2R Bridge from heels w/ext  2R Bridge U w/domenica 2R Bridge from heels w/ext  2R Bridge from toes w/ext                   1R box: prone ext              General ROM Hams,hip add,pirif,lunge, gastroc, soleus Hams,hip add,pirif,lunge Hams,hip add,pirif,lunge Hams,hip add,pirif,lunge   Standing quad stretch    Hams,hip add,pirif,lunge   Standing quad stretch    Hams,hip add,pirif,lunge   Standing quad stretch    Hams,hip add,pirif,lunge   Standing quad stretch      1R mermaid         Standing quad stretch  1R mermaid 1R mermaid                        Other   HEP Bridge, monster walks, wall sq,  standing quad str HEP Bridge, monster walks, wall sq,  standing quad str                                    Summary/Comments                                           Electronically signed by:  Les Rodriguez ATC

## 2018-02-28 ENCOUNTER — TREATMENT (OUTPATIENT)
Dept: PHYSICAL THERAPY | Age: 48
End: 2018-02-28

## 2018-03-09 ENCOUNTER — TREATMENT (OUTPATIENT)
Dept: PHYSICAL THERAPY | Age: 48
End: 2018-03-09

## 2018-03-14 ENCOUNTER — TREATMENT (OUTPATIENT)
Dept: PHYSICAL THERAPY | Age: 48
End: 2018-03-14

## 2018-03-14 NOTE — FLOWSHEET NOTE
[address]  Phone: [phone]  Fax [phone]      Date:  3/14/2018    Patient Name:  Antony Chandra    :  1970  MRN: [id number]  Restrictions/Precautions:    Medical/Treatment Diagnosis Information:  ·  Hist LBP, froz shldr     Physician Information:       Patient is Post-Op [] Yes   [] No     DOS:           2/28/18 3/9/18 3/14/18       Subjective Feeling good Feeling good my back is good and so is my knee Doing really well, ready to get back into walking outside if the weather was better                 Weeks Post-Op               Adjust program if needed     Objective Still working on trunk stab, good ham flexibility, gastroc soleus flexibility rated as good Improvements noted in trunk stab and hip disassociation    Improvements noted in trunk stab Still working on trunk stab, good ham flexibility Tightness noted in quads both legs  Trunk stab improvements noted Still working on trunk stab, good ham flexibility,              Goals Pilates conditioning for spine stab and maintenance Pilates conditioning for spine stab and maintenance Pilates conditioning for spine stab and maintenance Pilates conditioning for spine stab and maintenance Pilates conditioning for spine stab and maintenance Pilates conditioning for spine stab and maintenance Pilates conditioning for spine stab and maintenance             Reformer Exercises 2R1G walks, raises w/U str  2R1G U sq w/domenica  2R1G plie 3R walks, raises w/U str  3R U sq w/domenica  3R plie 3R walks, raises w/U str  3R U sq w/domenica  3R plie    2R1G walks, raises w/U str  2R1G U sq w/domenica  3R plie 2R1G walks, raises w/U str  2R1G U sq w/domenica  2R1G plie 2R1G walks, raises w/U str  2R1G U sq w/domenica  2R1G plie   2R1G walks, raises w/U str  2R1G U sq w/domenica 40/40  2R1G plie   Pelvic Stabilization   1R^1B #6 20/20/30 1R^1Y #5 20//30 1R^1Y #5 20/20/30 1R1B #5 30 1R1B #6 1R1B #6                   2R 1 arm standing quad str                                    Summary/Comments                                           Electronically signed by:  Dorys Teresa, ATC

## 2018-03-23 ENCOUNTER — TREATMENT (OUTPATIENT)
Dept: PHYSICAL THERAPY | Age: 48
End: 2018-03-23

## 2018-03-23 NOTE — FLOWSHEET NOTE
[address]  Phone: [phone]  Fax [phone]      Date:  3/23/2018    Patient Name:  Jeanna Ponce    :  1970  MRN: [id number]  Restrictions/Precautions:    Medical/Treatment Diagnosis Information:  ·  Hist LBP, froz shldr     Physician Information:       Patient is Post-Op [] Yes   [] No     DOS:           2/28/18 3/9/18 3/14/18 3/23/18      Subjective Feeling good Feeling good my back is good and so is my knee Doing really well, ready to get back into walking outside if the weather was better Feel good today                Weeks Post-Op               Adjust program if needed     Objective Still working on trunk stab, good ham flexibility, gastroc soleus flexibility rated as good Improvements noted in trunk stab and hip disassociation    Improvements noted in trunk stab Still working on trunk stab, good ham flexibility Tightness noted in quads both legs  Trunk stab improvements noted Still working on trunk stab, good ham flexibility,              Goals Pilates conditioning for spine stab and maintenance Pilates conditioning for spine stab and maintenance Pilates conditioning for spine stab and maintenance Pilates conditioning for spine stab and maintenance Pilates conditioning for spine stab and maintenance Pilates conditioning for spine stab and maintenance Pilates conditioning for spine stab and maintenance             Reformer Exercises 2R1G walks, raises w/U str  2R1G U sq w/domenica  2R1G plie 3R walks, raises w/U str  3R U sq w/domenica  3R plie 3R walks, raises w/U str  3R U sq w/domenica  3R plie    2R1G walks, raises w/U str  2R1G U sq w/domenica  3R plie 2R1G walks, raises w/U str  2R1G U sq w/domenica  2R1G plie 2R1G walks, raises w/U str  2R1G U sq w/domenica  2R1G plie   2R1G walks, raises w/U str  2R1G U sq w/domenica 40/40  2R1G plie   Pelvic Stabilization   1R^1B #6  1R^1Y #5  1R^1Y #5  1R1B #5  1R1B #6 1R1B #6

## 2018-03-28 ENCOUNTER — TREATMENT (OUTPATIENT)
Dept: PHYSICAL THERAPY | Age: 48
End: 2018-03-28

## 2018-03-28 NOTE — FLOWSHEET NOTE
[address], [address] [place] [address]  Phone: [phone]  Fax [phone]      Date:  3/28/2018    Patient Name:  Abraham Villatoro    :  1970  MRN: [id number]  Restrictions/Precautions:    Medical/Treatment Diagnosis Information:  ·  Hist LBP, froz shldr     Physician Information:       Patient is Post-Op [] Yes   [] No     DOS:           2/28/18 3/9/18 3/14/18 3/23/18 3/28/18     Subjective Feeling good Feeling good my back is good and so is my knee Doing really well, ready to get back into walking outside if the weather was better Feel good today I have a small rash and am on a steroid pack, Im not contagious               Weeks Post-Op                    Objective Still working on trunk stab, good ham flexibility, gastroc soleus flexibility rated as good Improvements noted in trunk stab and hip disassociation    Improvements noted in trunk stab Still working on trunk stab, good ham flexibility Tightness noted in quads both legs  Trunk stab improvements noted Still working on trunk stab, good ham flexibility,              Goals Pilates conditioning for spine stab and maintenance Pilates conditioning for spine stab and maintenance Pilates conditioning for spine stab and maintenance Pilates conditioning for spine stab and maintenance Pilates conditioning for spine stab and maintenance Pilates conditioning for spine stab and maintenance Pilates conditioning for spine stab and maintenance             Reformer Exercises 2R1G walks, raises w/U str  2R1G U sq w/domenica  2R1G plie 3R walks, raises w/U str  3R U sq w/domenica  3R plie 3R walks, raises w/U str  3R U sq w/domenica  3R plie    2R1G walks, raises w/U str  2R1G U sq w/domenica  3R plie 2R1G walks, raises w/U str  2R1G U sq w/domenica  2R1G plie 2R1G walks, raises w/U str  2R1G U sq w/domenica  2R1G plie   2R1G walks, raises w/U str  2R1G U sq w/domenica 40/40  2R1G plie   Pelvic Stabilization   1R^1B #6  1R^1Y #5 20/20/30 1R^1Y #5 20/20/30 1R1B #5 20/20/30 1R1B #6 1R1B #6                   2R 1 arm stand leg press 1R1B 1 arm stand leg press 2R 1 arm stand leg press             Trunk Stabilization 1R1B 90/90, leg lifts, scissor, knee ext  1R1B flex, obl 2R 90/90, leg lifts, scissor, knee ext  1R1B flex, obl 1R1B 90/90, leg lifts, scissor, knee ext  1R1B flex, obl    1R1B 90/90, leg lifts, scissor, knee ext  1R1B flex, obl 1R1B 90/90, leg lifts, scissor, knee ext  1R1B flex, obl 1R1B 90/90, leg lifts, scissor, knee ext  1R1B flex, obl 1R1B 90/90, leg lifts, scissor, knee ext  1R1B flex, obl                                 Hip Disassociation 2R1B ll,v,[place],ir/er,frog 2R1B ll,v,[place],ir/er,frog, U w/abd 2R1B ll,v,[place],ir/er,frog 2R1B ll,v,[place],ir/er,frog 2R1B ll,v,[place],ir/er,frog, U w/abd 2R1G ll,v,[place],ir/er,frog, U w/abd 2R1B ll,v,[place],ir/er,frog, U w/abd              1R1B stand leg press 1 arm 1R1B stand leg press 1 arm                  Scapular Stabilization 1R1B #6 1R1B #6 1R1B #5 1R1B #5  1R1B #6  #7 1R1B 6#       Seated: low row, shldr ext Seated:2R1B row, biceps          1R1B RC, ext               Thoracic Mobility 2R Bridge from heels  2R Bridge from toes 2R Bridge from heels  2R Bridge from toes 2R Bridge from heels w/ext  2R Bridge from toes w/ext 2R Bridge from heels w/ext  2R Bridge from toes w/ext 2R Bridge from heels w/ext  2R Bridge from toes w/ext 2R Bridge from heels w/ext  2R Bridge U w/domenica 2R Bridge from heels w/ext  2R Bridge from toes w/ext                   1R box: prone ext              General ROM Hams,hip add,pirif,lunge, gastroc, soleus Hams,hip add,pirif,lunge Hams,hip add,pirif,lunge Hams,hip add,pirif,lunge   Standing quad stretch    Hams,hip add,pirif,lunge   Standing quad stretch    Hams,hip add,pirif,lunge   Standing quad stretch    Hams,hip add,pirif,lunge   Standing quad stretch      1R mermaid         Standing quad stretch  1R mermaid 1R mermaid                        Other   [facility] ilya Quintero

## 2018-04-04 ENCOUNTER — TREATMENT (OUTPATIENT)
Dept: PHYSICAL THERAPY | Age: 48
End: 2018-04-04

## 2018-04-11 ENCOUNTER — TREATMENT (OUTPATIENT)
Dept: PHYSICAL THERAPY | Age: 48
End: 2018-04-11

## 2018-04-17 ENCOUNTER — TREATMENT (OUTPATIENT)
Dept: PHYSICAL THERAPY | Age: 48
End: 2018-04-17

## 2018-04-25 ENCOUNTER — TREATMENT (OUTPATIENT)
Dept: PHYSICAL THERAPY | Age: 48
End: 2018-04-25

## 2018-05-02 ENCOUNTER — TREATMENT (OUTPATIENT)
Dept: PHYSICAL THERAPY | Age: 48
End: 2018-05-02

## 2018-05-09 ENCOUNTER — TREATMENT (OUTPATIENT)
Dept: PHYSICAL THERAPY | Age: 48
End: 2018-05-09

## 2018-05-16 ENCOUNTER — TREATMENT (OUTPATIENT)
Dept: PHYSICAL THERAPY | Age: 48
End: 2018-05-16

## 2018-05-23 ENCOUNTER — TREATMENT (OUTPATIENT)
Dept: PHYSICAL THERAPY | Age: 48
End: 2018-05-23

## 2018-05-31 ENCOUNTER — TREATMENT (OUTPATIENT)
Dept: PHYSICAL THERAPY | Age: 48
End: 2018-05-31

## 2018-06-05 ENCOUNTER — TREATMENT (OUTPATIENT)
Dept: PHYSICAL THERAPY | Age: 48
End: 2018-06-05

## 2018-06-13 ENCOUNTER — TREATMENT (OUTPATIENT)
Dept: PHYSICAL THERAPY | Age: 48
End: 2018-06-13

## 2018-06-20 ENCOUNTER — TREATMENT (OUTPATIENT)
Dept: PHYSICAL THERAPY | Age: 48
End: 2018-06-20

## 2018-06-26 ENCOUNTER — TREATMENT (OUTPATIENT)
Dept: PHYSICAL THERAPY | Age: 48
End: 2018-06-26

## 2018-07-06 ENCOUNTER — TREATMENT (OUTPATIENT)
Dept: PHYSICAL THERAPY | Age: 48
End: 2018-07-06

## 2018-07-06 NOTE — FLOWSHEET NOTE
stretch on wall                                   Summary/Comments                                           Electronically signed by:  Evan Barone, ATC

## 2018-07-13 ENCOUNTER — TREATMENT (OUTPATIENT)
Dept: PHYSICAL THERAPY | Age: 48
End: 2018-07-13

## 2018-07-13 NOTE — FLOWSHEET NOTE
[address]  Phone: [phone]  Fax [phone]      Date:  2018    Patient Name:  Miroslava Pierce    :  1970  MRN: [id number]  Restrictions/Precautions:    Medical/Treatment Diagnosis Information:  ·  Hist LBP, froz shldr     Physician Information:       Patient is Post-Op [] Yes   [] No     DOS:           18    Subjective Doing well No changes Feel good, not liking the heat Doing well Doing well just tired from the heat Had a good vacation no physical problems              Weeks Post-Op                    Objective Still working on trunk stab, good ham flexibility, gastroc soleus flexibility rated as good Improvements noted in trunk stab and hip disassociation    Improvements noted in trunk stab Still working on trunk stab, good ham flexibility Upper trap tightness, scap elevates with shldr flex Trunk stab improvements noted Still working on trunk stab, good ham flexibility,              Goals Pilates conditioning for spine stab and maintenance Pilates conditioning for spine stab and maintenance Pilates conditioning for spine stab and maintenance Pilates conditioning for spine stab and maintenance Pilates conditioning for spine stab and scap stab maintenance Pilates conditioning for spine stab and maintenance Pilates conditioning for spine stab and maintenance             Reformer Exercises 2R1G walks, raises w/U str  2R1G U sq w/domenica  2R1G plie 3R walks, raises w/U str  3R U sq w/domenica  3R plie 3R walks, raises w/U str  3R U sq w/domenica  3R plie    2R1G walks, raises w/U str  2R1G U sq w/domenica  3R plie 2R1G walks, raises w/U str  2R1G U sq w/domenica  2R1G plie 2R1G walks, raises w/U str  2R1G U sq w/domenica  2R1G plie   2R1G walks, raises w/U str  2R1G U sq w/domenica 40/40  2R1G plie   Pelvic Stabilization   1R^1B #6 30 1R^1Y #5 30 1R^1Y #5  1R1B #5 20/20/30 1R1B #6 1R1B #6 monster walks, wall sq,  standing quad str OH stretch on wall                                   Summary/Comments                                           Electronically signed by:  Jere Lantigua, ATC

## 2018-07-18 ENCOUNTER — TREATMENT (OUTPATIENT)
Dept: PHYSICAL THERAPY | Age: 48
End: 2018-07-18

## 2018-07-18 NOTE — FLOWSHEET NOTE
walks, wall sq,  standing quad str [facility] Bridge, monster walks, wall sq,  standing quad str OH stretch on wall                                   Summary/Comments                                           Electronically signed by:  Bhavik Anguiano ATC

## 2018-07-25 ENCOUNTER — TREATMENT (OUTPATIENT)
Dept: PHYSICAL THERAPY | Age: 48
End: 2018-07-25

## 2018-08-03 ENCOUNTER — TREATMENT (OUTPATIENT)
Dept: PHYSICAL THERAPY | Age: 48
End: 2018-08-03

## 2018-08-03 NOTE — FLOWSHEET NOTE
[address], [address] [place] [address]  Phone: [phone]  Fax [phone]      Date:  8/3/2018    Patient Name:  Hedy Celaya    :  1970  MRN: [id number]  Restrictions/Precautions:    Medical/Treatment Diagnosis Information:  ·  Hist LBP, froz shldr     Physician Information:       Patient is Post-Op [] Yes   [] No     DOS:           7/25/18 8/3/18        Subjective Been training for the Tom walk Feling good                  Weeks Post-Op                    Objective Still working on trunk stab, good ham flexibility, gastroc soleus flexibility rated as good Improvements noted in trunk stab and hip disassociation    Improvements noted in trunk stab Still working on trunk stab, good ham flexibility Upper trap tightness, scap elevates with shldr flex Trunk stab improvements noted Still working on trunk stab, good ham flexibility,              Goals Pilates conditioning for spine stab and maintenance Pilates conditioning for spine stab and maintenance Pilates conditioning for spine stab and maintenance Pilates conditioning for spine stab and maintenance Pilates conditioning for spine stab and scap stab maintenance Pilates conditioning for spine stab and maintenance Pilates conditioning for spine stab and maintenance             Reformer Exercises 2R1G walks, raises w/U str  2R1G U sq w/domenica  2R1G plie 3R walks, raises w/U str  3R U sq w/domenica  3R plie 3R walks, raises w/U str  3R U sq w/domenica  3R plie    2R1G walks, raises w/U str  2R1G U sq w/domenica  3R plie 2R1G walks, raises w/U str  2R1G U sq w/domenica  2R1G plie 2R1G walks, raises w/U str  2R1G U sq w/domenica  2R1G plie   2R1G walks, raises w/U str  2R1G U sq w/domenica 40/40  2R1G plie   Pelvic Stabilization   1R^1B #6 20/30 1R^1Y #5 30 1R^1Y #5 30 1R1B #5 30 1R1B #6 1R1B #6                   2R 1 arm stand leg press 1R1B 1 arm stand leg press 2R 1 arm stand leg press             Trunk Stabilization

## 2018-08-07 ENCOUNTER — TREATMENT (OUTPATIENT)
Dept: PHYSICAL THERAPY | Age: 48
End: 2018-08-07

## 2018-08-07 NOTE — FLOWSHEET NOTE
[address]  Phone: [phone]  Fax [phone]      Date:  2018    Patient Name:  Armando Jose    :  1970  MRN: [id number]  Restrictions/Precautions:    Medical/Treatment Diagnosis Information:  ·  Hist LBP, froz shldr     Physician Information:       Patient is Post-Op [] Yes   [] No     DOS:           7/25/18 8/3/18 8/7/18       Subjective Been training for the Tom walk Doris good Doing well                 Weeks Post-Op                    Objective Still working on trunk stab, good ham flexibility, gastroc soleus flexibility rated as good Improvements noted in trunk stab and hip disassociation    Improvements noted in trunk stab Still working on trunk stab, good ham flexibility Upper trap tightness, scap elevates with shldr flex Trunk stab improvements noted Still working on trunk stab, good ham flexibility,              Goals Pilates conditioning for spine stab and maintenance Pilates conditioning for spine stab and maintenance Pilates conditioning for spine stab and maintenance Pilates conditioning for spine stab and maintenance Pilates conditioning for spine stab and scap stab maintenance Pilates conditioning for spine stab and maintenance Pilates conditioning for spine stab and maintenance             Reformer Exercises 2R1G walks, raises w/U str  2R1G U sq w/domenica  2R1G plie 3R walks, raises w/U str  3R U sq w/domenica  3R plie 3R walks, raises w/U str  3R U sq w/domenica  3R plie    2R1G walks, raises w/U str  2R1G U sq w/domenica  3R plie 2R1G walks, raises w/U str  2R1G U sq w/domenica  2R1G plie 2R1G walks, raises w/U str  2R1G U sq w/domenica  2R1G plie   2R1G walks, raises w/U str  2R1G U sq w/domenica 40/40  2R1G plie   Pelvic Stabilization   1R^1B #6 2030 1R^1Y #5 30  1R1B #5 30 1R1B #6 1R1B #6                   2R 1 arm stand leg press 1R1B 1 arm stand leg press 2R 1 arm stand leg press             Trunk Stabilization 1R1B 90/90, leg lifts, scissor, knee ext  1R1B flex, obl 2R 90/90, leg lifts, scissor, knee ext  1R1B flex, obl 1R1B 90/90, leg lifts, scissor, knee ext  1R1B flex, obl    1R1B 90/90, leg lifts, scissor, knee ext  1R1B flex, obl 1R1B 90/90, leg lifts, scissor, knee ext  1R1B flex, obl  SCAP STAB 1R1B 90/90, leg lifts, scissor, knee ext  1R1B flex, obl 1R1B 90/90, leg lifts, scissor, knee ext  1R1B flex, obl                                 Hip Disassociation 2R1B ll,v,[place],ir/er,frog 2R1B ll,v,[place],ir/er,frog, U w/abd 2R1B ll,v,[place],ir/er,frog 2R1B ll,v,[place],ir/er,frog 2R1B ll,v,[place],ir/er,frog, U w/abd 2R1G ll,v,[place],ir/er,frog, U w/abd 2R1B ll,v,[place],ir/er,frog, U w/abd              1R1B stand leg press 1 arm 1R1B stand leg press 1 arm 1R1B stand leg press 1 arm                  Scapular Stabilization 1R1B #6 1R1B #6  1R1B #5  1R1B #6  #7 1R1B 6#       Seated: low row, shldr ext Seated:2R1B row, biceps          1R1B RC, ext               Thoracic Mobility 2R Bridge from heels  2R Bridge from toes 2R Bridge from heels  2R Bridge from toes 2R Bridge from heels w/ext  2R Bridge from toes w/ext 2R Bridge from heels w/ext  2R Bridge from toes w/ext 2R Bridge from heels w/ext  2R Bridge from toes w/ext 2R Bridge from heels w/ext  2R Bridge U w/domenica 2R Bridge from heels w/ext  2R Bridge from toes w/ext      Seated:2R1B row, biceps   1R1B RC, ext                1R box: prone ext              General ROM Hams,hip add,pirif,lunge, gastroc, soleus Hams,hip add,pirif,lunge Hams,hip add,pirif,lunge Hams,hip add,pirif,lunge   Standing quad stretch    Hams,hip add,pirif,lunge   Standing quad stretch    Hams,hip add,pirif,lunge   Standing quad stretch    Hams,hip add,pirif,lunge   Standing quad stretch      1R mermaid         Standing quad stretch  1R mermaid 1R mermaid                        Other   [facility] Bridge, monster walks, wall sq,  standing quad str [facility], monster walks, wall sq,  standing quad str OH stretch on

## 2018-08-15 ENCOUNTER — TREATMENT (OUTPATIENT)
Dept: PHYSICAL THERAPY | Age: 48
End: 2018-08-15

## 2018-08-15 NOTE — FLOWSHEET NOTE
sq,  standing quad str OH stretch on wall                                   Summary/Comments                                           Electronically signed by:  Hoa Chamberlain, ATC

## 2018-08-24 ENCOUNTER — TREATMENT (OUTPATIENT)
Dept: PHYSICAL THERAPY | Age: 48
End: 2018-08-24

## 2018-08-24 NOTE — FLOWSHEET NOTE
[address]  Phone: [phone]  Fax [phone]      Date:  2018    Patient Name:  Wai Vick    :  1970  MRN: [id number]  Restrictions/Precautions:    Medical/Treatment Diagnosis Information:  ·  Hist LBP, froz shldr     Physician Information:       Patient is Post-Op [] Yes   [] No     DOS:           7/25/18 8/3/18 8/7/18 8/15/18 8/24/18     Subjective Been training for the [place] walk Feling good Doing well Walked 12 miles and did well Training for [place] walk is going well. R lateral ankle hurts a little today.                 Weeks Post-Op                    Objective Still working on trunk stab, good ham flexibility, gastroc soleus flexibility rated as good Improvements noted in trunk stab and hip disassociation    Improvements noted in trunk stab Still working on trunk stab, good ham flexibility Upper trap tightness, scap elevates with shldr flex Trunk stab improvements noted Still working on trunk stab, good ham flexibility,              Goals Pilates conditioning for spine stab and maintenance Pilates conditioning for spine stab and maintenance Pilates conditioning for spine stab and maintenance Pilates conditioning for spine stab and maintenance Pilates conditioning for spine stab and scap stab maintenance Pilates conditioning for spine stab and maintenance Pilates conditioning for spine stab and maintenance             Reformer Exercises 2R1G walks, raises w/U str  2R1G U sq w/domenica  2R1G plie 3R walks, raises w/U str  3R U sq w/domenica  3R plie 3R walks, raises w/U str  3R U sq w/domenica  3R plie    2R1G walks, raises w/U str  2R1G U sq w/domenica  3R plie 2R1G walks, raises w/U str  2R1G U sq w/domenica  2R1G plie 2R1G walks, raises w/U str  2R1G U sq w/domenica  2R1G plie   2R1G walks, raises w/U str  2R1G U sq w/domenica 40/40  2R1G plie   Pelvic Stabilization   1R^1B #6 30 1R^1Y #5 30  1R1B #5 20/20/30 1R1B #6 1R1B #6

## 2018-08-29 ENCOUNTER — TREATMENT (OUTPATIENT)
Dept: PHYSICAL THERAPY | Age: 48
End: 2018-08-29

## 2018-08-29 NOTE — FLOWSHEET NOTE
[address]  Phone: [phone]  Fax [phone]      Date:  2018    Patient Name:  Lissa Garcia    :  1970  MRN: [id number]  Restrictions/Precautions:    Medical/Treatment Diagnosis Information:  ·  Hist LBP, froz shldr     Physician Information:       Patient is Post-Op [] Yes   [] No     DOS:           7/25/18 8/3/18 8/7/18 8/15/18 8/24/18 8/29/18    Subjective Been training for the [employer] walk Feling good Doing well Walked 12 miles and did well Training for [employer] walk is going well. R lateral ankle hurts a little today.   Felling good today              Weeks Post-Op                    Objective Still working on trunk stab, good ham flexibility, gastroc soleus flexibility rated as good Improvements noted in trunk stab and hip disassociation    Improvements noted in trunk stab Still working on trunk stab, good ham flexibility Upper trap tightness, scap elevates with shldr flex Trunk stab improvements noted Still working on trunk stab, good ham flexibility,              Goals Pilates conditioning for spine stab and maintenance Pilates conditioning for spine stab and maintenance Pilates conditioning for spine stab and maintenance Pilates conditioning for spine stab and maintenance Pilates conditioning for spine stab and scap stab maintenance Pilates conditioning for spine stab and maintenance Pilates conditioning for spine stab and maintenance             Reformer Exercises 2R1G walks, raises w/U str  2R1G U sq w/domenica  2R1G plie 3R walks, raises w/U str  3R U sq w/domenica  3R plie 3R walks, raises w/U str  3R U sq w/domenica  3R plie    2R1G walks, raises w/U str  2R1G U sq w/domenica  3R plie 2R1G walks, raises w/U str  2R1G U sq w/domenica  2R1G plie 2R1G walks, raises w/U str  2R1G U sq w/domenica  2R1G plie   2R1G walks, raises w/U str  2R1G U sq w/domenica 40/40  2R1G plie   Pelvic Stabilization   1R^1B #6 30 1R^1Y #5 /30  1R1B #5 20/20/30 1R1B #6 1R1B #6                   2R 1 arm stand leg press 1R1B 1 arm stand leg press 2R 1 arm stand leg press             Trunk Stabilization 1R1B 90/90, leg lifts, scissor, knee ext  1R1B flex, obl 2R 90/90, leg lifts, scissor, knee ext  1R1B flex, obl 1R1B 90/90, leg lifts, scissor, knee ext  1R1B flex, obl    1R1B 90/90, leg lifts, scissor, knee ext  1R1B flex, obl 1R1B 90/90, leg lifts, scissor, knee ext  1R1B flex, obl  SCAP STAB 1R1B 90/90, leg lifts, scissor, knee ext  1R1B flex, obl 1R1B 90/90, leg lifts, scissor, knee ext  1R1B flex, obl                                 Hip Disassociation 2R1B ll,v,[place],ir/er,frog 2R1B ll,v,[place],ir/er,frog, U w/abd 2R1B ll,v,[place],ir/er,frog 2R1B ll,v,[place],ir/er,frog 2R1B ll,v,[place],ir/er,frog, U w/abd 2R1G ll,v,[place],ir/er,frog, U w/abd 2R1B ll,v,[place],ir/er,frog, U w/abd              1R1B stand leg press 1 arm 1R1B stand leg press 1 arm 1R1B stand leg press 1 arm                  Scapular Stabilization 1R1B #6 1R1B #6  1R1B #5  1R1B #6  #7 1R1B 6#       Seated: low row, shldr ext Seated:2R1B row, biceps          1R1B RC, ext               Thoracic Mobility 2R Bridge from heels  2R Bridge from toes 2R Bridge from heels  2R Bridge from toes 2R Bridge from heels w/ext  2R Bridge from toes w/ext 2R Bridge from heels w/ext  2R Bridge from toes w/ext 2R Bridge from heels w/ext  2R Bridge from toes w/ext 2R Bridge from heels w/ext  2R Bridge U w/domenica 2R Bridge from heels w/ext  2R Bridge from toes w/ext      Seated:2R1B row, biceps   1R1B RC, ext                1R box: prone ext              General ROM Hams,hip add,pirif,lunge, gastroc, soleus Hams,hip add,pirif,lunge Hams,hip add,pirif,lunge Hams,hip add,pirif,lunge   Standing quad stretch    Hams,hip add,pirif,lunge   Standing quad stretch    Hams,hip add,pirif,lunge   Standing quad stretch    Hams,hip add,pirif,lunge   Standing quad stretch      1R mermaid         Standing quad stretch  1R mermaid 1R corina                        Other   [facility], monster walks, wall sq,  standing quad str [facility], monster walks, wall sq,  standing quad str OH stretch on wall                                   Summary/Comments                                           Electronically signed by:  Van Neville ATC

## 2018-09-06 ENCOUNTER — TREATMENT (OUTPATIENT)
Dept: PHYSICAL THERAPY | Age: 48
End: 2018-09-06

## 2018-09-11 ENCOUNTER — TREATMENT (OUTPATIENT)
Dept: PHYSICAL THERAPY | Age: 48
End: 2018-09-11

## 2018-09-11 NOTE — FLOWSHEET NOTE
1R1B 90/90, leg lifts, scissor, knee ext  1R1B flex, obl 2R 90/90, leg lifts, scissor, knee ext  1R1B flex, obl 1R1B 90/90, leg lifts, scissor, knee ext  1R1B flex, obl    1R1B 90/90, leg lifts, scissor, knee ext  1R1B flex, obl 1R1B 90/90, leg lifts, scissor, knee ext  1R1B flex, obl  SCAP STAB 1R1B 90/90, leg lifts, scissor, knee ext  1R1B flex, obl 1R1B 90/90, leg lifts, scissor, knee ext  1R1B flex, obl                                 Hip Disassociation 2R1B ll,v,[place],ir/er,frog 2R1B ll,v,[place],ir/er,frog, U w/abd 2R1B ll,v,[place],ir/er,frog 2R1B ll,v,[place],ir/er,frog 2R1B ll,v,[place],ir/er,frog, U w/abd 2R1G ll,v,[place],ir/er,frog, U w/abd 2R1B ll,v,[place],ir/er,frog, U w/abd              1R1B stand leg press 1 arm 1R1B stand leg press 1 arm 1R1B stand leg press 1 arm                  Scapular Stabilization 1R1B #6 1R1B #6  1R1B #5  1R1B #6  #7 1R1B 6#       Seated: low row, shldr ext Seated:2R1B row, biceps          1R1B RC, ext               Thoracic Mobility 2R Bridge from heels  2R Bridge from toes 2R Bridge from heels  2R Bridge from toes 2R Bridge from heels w/ext  2R Bridge from toes w/ext 2R Bridge from heels w/ext  2R Bridge from toes w/ext 2R Bridge from heels w/ext  2R Bridge from toes w/ext 2R Bridge from heels w/ext  2R Bridge U w/domenica 2R Bridge from heels w/ext  2R Bridge from toes w/ext      Seated:2R1B row, biceps   1R1B RC, ext                1R box: prone ext              General ROM Hams,hip add,pirif,lunge, gastroc, soleus Hams,hip add,pirif,lunge Hams,hip add,pirif,lunge Hams,hip add,pirif,lunge   Standing quad stretch    Hams,hip add,pirif,lunge   Standing quad stretch    Hams,hip add,pirif,lunge   Standing quad stretch    Hams,hip add,pirif,lunge   Standing quad stretch      1R mermaid         Standing quad stretch  1R mermaid 1R mermaid                        Other   [facility] Bridge, monster walks, wall sq,  standing quad str [facility], monster walks, wall sq,  standing quad str OH stretch on wall                                   Summary/Comments                                           Electronically signed by:  Sree Kramer ATC

## 2018-09-20 ENCOUNTER — TREATMENT (OUTPATIENT)
Dept: PHYSICAL THERAPY | Age: 48
End: 2018-09-20

## 2018-09-20 NOTE — FLOWSHEET NOTE
Trunk Stabilization 1R1B 90/90, leg lifts, scissor, knee ext  1R1B flex, obl 2R 90/90, leg lifts, scissor, knee ext  1R1B flex, obl 1R1B 90/90, leg lifts, scissor, knee ext  1R1B flex, obl    1R1B 90/90, leg lifts, scissor, knee ext  1R1B flex, obl 1R1B 90/90, leg lifts, scissor, knee ext  1R1B flex, obl  SCAP STAB 1R1B 90/90, leg lifts, scissor, knee ext  1R1B flex, obl 1R1B 90/90, leg lifts, scissor, knee ext  1R1B flex, obl                                 Hip Disassociation 2R1B ll,v,[place],ir/er,frog 2R1B ll,v,[place],ir/er,frog, U w/abd 2R1B ll,v,[place],ir/er,frog 2R1B ll,v,[place],ir/er,frog 2R1B ll,v,[place],ir/er,frog, U w/abd 2R1G ll,v,[place],ir/er,frog, U w/abd 2R1B ll,v,[place],ir/er,frog, U w/abd              1R1B stand leg press 1 arm 1R1B stand leg press 1 arm 1R1B stand leg press 1 arm                  Scapular Stabilization 1R1B #6 1R1B #6  1R1B #5  1R1B #6  #7 1R1B 6#       Seated: low row, shldr ext Seated:2R1B row, biceps          1R1B RC, ext               Thoracic Mobility 2R Bridge from heels  2R Bridge from toes 2R Bridge from heels  2R Bridge from toes 2R Bridge from heels w/ext  2R Bridge from toes w/ext 2R Bridge from heels w/ext  2R Bridge from toes w/ext 2R Bridge from heels w/ext  2R Bridge from toes w/ext 2R Bridge from heels w/ext  2R Bridge U w/domenica 2R Bridge from heels w/ext  2R Bridge from toes w/ext      Seated:2R1B row, biceps   1R1B RC, ext                1R box: prone ext              General ROM Hams,hip add,pirif,lunge, gastroc, soleus Hams,hip add,pirif,lunge Hams,hip add,pirif,lunge Hams,hip add,pirif,lunge   Standing quad stretch    Hams,hip add,pirif,lunge   Standing quad stretch    Hams,hip add,pirif,lunge   Standing quad stretch    Hams,hip add,pirif,lunge   Standing quad stretch      1R mermaid         Standing quad stretch  1R mermaid 1R mermaid                        Other   [facility] Bridge, monster walks, wall sq,  standing quad str [facility], monster walks, wall sq,

## 2018-09-25 ENCOUNTER — TREATMENT (OUTPATIENT)
Dept: PHYSICAL THERAPY | Age: 48
End: 2018-09-25

## 2018-10-02 ENCOUNTER — TREATMENT (OUTPATIENT)
Dept: PHYSICAL THERAPY | Age: 48
End: 2018-10-02

## 2018-10-12 ENCOUNTER — TREATMENT (OUTPATIENT)
Dept: PHYSICAL THERAPY | Age: 48
End: 2018-10-12

## 2018-10-17 ENCOUNTER — TREATMENT (OUTPATIENT)
Dept: PHYSICAL THERAPY | Age: 48
End: 2018-10-17

## 2018-10-31 ENCOUNTER — TREATMENT (OUTPATIENT)
Dept: PHYSICAL THERAPY | Age: 48
End: 2018-10-31

## 2018-10-31 NOTE — FLOWSHEET NOTE
[address]  Phone: [phone]  Fax [phone]      Date:  10/31/2018    Patient Name:  Mar Boswell    :  1970  MRN: [id number]  Restrictions/Precautions:    Medical/Treatment Diagnosis Information:  ·  Hist LBP, froz shldr     Physician Information:       Patient is Post-Op [] Yes   [] No     DOS:           9/11/18 9/20/18 9/25/18 10/2/18 10/12/18 10/17/18 10/31/18   Subjective Doing well Doing well Doing well Feeling good Walking the [facility] tomorrow the outside of my right ankle is feeling tight Did great during the walk, wore my ankle brace and stretched a lot Feeling good no physical complaints    Visit  Visit  Visit 2 Visit 3/7 Visit  Visit  Visit /   Weeks Post-Op                    Objective Still working on trunk stab, good ham flexibility, gastroc soleus flexibility rated as good Improvements noted in trunk stab and hip disassociation    Improvements noted in trunk stab Still working on trunk stab, good ham flexibility Upper trap tightness, scap elevates with shldr flex Trunk stab improvements noted Still working on trunk stab, good ham flexibility,              Goals Pilates conditioning for spine stab and maintenance Pilates conditioning for spine stab and maintenance Pilates conditioning for spine stab and maintenance Pilates conditioning for spine stab and maintenance Pilates conditioning for spine stab and scap stab maintenance Pilates conditioning for spine stab and maintenance Pilates conditioning for spine stab and maintenance             Reformer Exercises 2R1G walks, raises w/U str  2R1G U sq w/domenica  2R1G plie 3R walks, raises w/U str  3R U sq w/domenica  3R plie 3R walks, raises w/U str  3R U sq w/domenica  3R plie    2R1G walks, raises w/U str  2R1G U sq w/domenica  3R plie 2R1G walks, raises w/U str  2R1G U sq w/domenica  2R1G plie  Peroneal stretch 2R1G walks, raises w/U str  2R1G U sq w/domenica  2R1G plie   2R1G

## 2018-11-07 ENCOUNTER — TREATMENT (OUTPATIENT)
Dept: PHYSICAL THERAPY | Age: 48
End: 2018-11-07

## 2018-11-07 NOTE — FLOWSHEET NOTE
[address]  Phone: [phone]  Fax [phone]      Date:  2018    Patient Name:  Isaac Ross    :  1970  MRN: [id number]  Restrictions/Precautions:    Medical/Treatment Diagnosis Information:  ·  Hist LBP, froz shldr     Physician Information:       Patient is Post-Op [] Yes   [] No     DOS:           18         Subjective Feeling good no complaints                   Weeks Post-Op                    Objective Still working on trunk stab  General strength improvements Improvements noted in trunk stab and hip disassociation    Improvements noted in trunk stab Still working on trunk stab, good ham flexibility Upper trap tightness, scap elevates with shldr flex Trunk stab improvements noted Still working on trunk stab, good ham flexibility,              Goals Pilates conditioning for spine stab and maintenance Pilates conditioning for spine stab and maintenance Pilates conditioning for spine stab and maintenance Pilates conditioning for spine stab and maintenance Pilates conditioning for spine stab and scap stab maintenance Pilates conditioning for spine stab and maintenance Pilates conditioning for spine stab and maintenance             Reformer Exercises 2R1G walks, raises w/U str  2R1G U sq w/domenica  2R1G plie 3R walks, raises w/U str  3R U sq w/domenica  3R plie 3R walks, raises w/U str  3R U sq w/domenica  3R plie    2R1G walks, raises w/U str  2R1G U sq w/domenica  3R plie 2R1G walks, raises w/U str  2R1G U sq w/domenica  2R1G plie  Peroneal stretch 2R1G walks, raises w/U str  2R1G U sq w/domenica  2R1G plie   2R1G walks, raises w/U str  2R1G U sq w/domenica 40/40  2R1G plie   Pelvic Stabilization   1R^1B #6 /20/30 1R^1Y #5 /20/30  1R1B #5 /20/30 1R1B #6 1R1B #6                   2R 1 arm stand leg press 1R1B 1 arm stand leg press 2R 1 arm stand leg press             Trunk Stabilization 1R1B 90/90, leg lifts, scissor, knee ext  1R1B flex, obl 2R Summary/Comments                                           Electronically signed by:  Myla Boas, ATC

## 2018-11-14 ENCOUNTER — TREATMENT (OUTPATIENT)
Dept: PHYSICAL THERAPY | Age: 48
End: 2018-11-14

## 2018-11-21 ENCOUNTER — TREATMENT (OUTPATIENT)
Dept: PHYSICAL THERAPY | Age: 48
End: 2018-11-21

## 2018-11-28 ENCOUNTER — TREATMENT (OUTPATIENT)
Dept: PHYSICAL THERAPY | Age: 48
End: 2018-11-28

## 2018-12-05 ENCOUNTER — TREATMENT (OUTPATIENT)
Dept: PHYSICAL THERAPY | Age: 48
End: 2018-12-05

## 2018-12-05 NOTE — FLOWSHEET NOTE
40/40  2R1G plie   Pelvic Stabilization   1R^1B #6 20/20/30 1R^1Y #5 20/20/30  1R1B #5 20/20/30 1R1B #6 1R1B #6                   2R 1 arm stand leg press 1R1B 1 arm stand leg press 2R 1 arm stand leg press             Trunk Stabilization 1R1B 90/90, leg lifts, scissor, knee ext  1R1B flex, obl 2R 90/90, leg lifts, scissor, knee ext  1R1B flex, obl 1R1B 90/90, leg lifts, scissor, knee ext  1R1B flex, obl    1R1B 90/90, leg lifts, scissor, knee ext  1R1B flex, obl 1R1B 90/90, leg lifts, scissor, knee ext  1R1B flex, obl  SCAP STAB 1R1B 90/90, leg lifts, scissor, knee ext  1R1B flex, obl 1R1B 90/90, leg lifts, scissor, knee ext  1R1B flex, obl                                 Hip Disassociation 2R1B ll,v,[place],ir/er,frog 2R1B ll,v,[place],ir/er,frog, U w/abd 2R1B ll,v,[place],ir/er,frog 2R1B ll,v,[place],ir/er,frog 2R1B ll,v,[place],ir/er,frog, U w/abd 2R1G ll,v,[place],ir/er,frog, U w/abd 2R1B ll,v,[place],ir/er,frog, U w/abd              1R1B stand leg press 1 arm 1R1B stand leg press 1 arm 1R1B stand leg press 1 arm                  Scapular Stabilization 1R1B #6 1R1B #6 1R1B #5 1R1B #5  1R1B #6  #7 1R1B 6#       Shoulder wall stretch, IR stretch with towel Seated:2R1B row, biceps          1R1B RC, ext               Thoracic Mobility 2R Bridge from heels  2R Bridge from toes 2R Bridge from heels  2R Bridge from toes 2R Bridge from heels w/ext  2R Bridge from toes w/ext 2R Bridge from heels w/ext  2R Bridge from toes w/ext 2R Bridge from heels w/ext  2R Bridge from toes w/ext 2R Bridge from heels w/ext  2R Bridge U w/domenica 2R Bridge from heels w/ext  2R Bridge from toes w/ext      Seated:2R1B row, biceps   1R1B RC, ext                1R box: prone ext              General ROM Hams,hip add,pirif,lunge, gastroc, soleus Hams,hip add,pirif,lunge Hams,hip add,pirif,lunge Hams,hip add,pirif,lunge   Standing quad stretch    Hams,hip add,pirif,lunge   Standing quad stretch    Hams,hip add,pirif,lunge   Standing quad stretch

## 2018-12-12 ENCOUNTER — TREATMENT (OUTPATIENT)
Dept: PHYSICAL THERAPY | Age: 48
End: 2018-12-12

## 2018-12-12 NOTE — FLOWSHEET NOTE
walks, raises w/U str  2R1G U sq w/domenica 40/40  2R1G plie   Pelvic Stabilization   1R^1B #6 20/20/30 1R^1Y #5 20/20/30  1R1B #5 20/20/30 1R1B #6 1R1B #6                   2R 1 arm stand leg press 1R1B 1 arm stand leg press 2R 1 arm stand leg press             Trunk Stabilization 1R1B 90/90, leg lifts, scissor, knee ext  1R1B flex, obl 2R 90/90, leg lifts, scissor, knee ext  1R1B flex, obl 1R1B 90/90, leg lifts, scissor, knee ext  1R1B flex, obl    1R1B 90/90, leg lifts, scissor, knee ext  1R1B flex, obl 1R1B 90/90, leg lifts, scissor, knee ext  1R1B flex, obl  SCAP STAB 1R1B 90/90, leg lifts, scissor, knee ext  1R1B flex, obl 1R1B 90/90, leg lifts, scissor, knee ext  1R1B flex, obl                                 Hip Disassociation 2R1B ll,v,[place],ir/er,frog 2R1B ll,v,[place],ir/er,frog, U w/abd 2R1B ll,v,[place],ir/er,frog 2R1B ll,v,[place],ir/er,frog 2R1B ll,v,[place],ir/er,frog, U w/abd 2R1G ll,v,[place],ir/er,frog, U w/abd 2R1B ll,v,[place],ir/er,frog, U w/abd              1R1B stand leg press 1 arm 1R1B stand leg press 1 arm 1R1B stand leg press 1 arm                  Scapular Stabilization 1R1B #6 1R1B #6 1R1B #5 1R1B #5  1R1B #6  #7 1R1B 6#       Shoulder wall stretch, IR stretch with towel Seated:2R1B row, biceps          1R1B RC, ext               Thoracic Mobility 2R Bridge from heels  2R Bridge from toes 2R Bridge from heels  2R Bridge from toes 2R Bridge from heels w/ext  2R Bridge from toes w/ext 2R Bridge from heels w/ext  2R Bridge from toes w/ext 2R Bridge from heels w/ext  2R Bridge from toes w/ext 2R Bridge from heels w/ext  2R Bridge U w/domenica 2R Bridge from heels w/ext  2R Bridge from toes w/ext      Seated:2R1B row, biceps   1R1B RC, ext                1R box: prone ext              General ROM Hams,hip add,pirif,lunge, gastroc, soleus Hams,hip add,pirif,lunge Hams,hip add,pirif,lunge Hams,hip add,pirif,lunge   Standing quad stretch    Hams,hip add,pirif,lunge   Standing quad stretch    Hams,hip

## 2018-12-19 ENCOUNTER — TREATMENT (OUTPATIENT)
Dept: PHYSICAL THERAPY | Age: 48
End: 2018-12-19

## 2018-12-19 NOTE — FLOWSHEET NOTE
[address]  Phone: [phone]  Fax [phone]      Date:  2018    Patient Name:  Lizbeth Mandujano    :  1970  MRN: [id number]  Restrictions/Precautions:    Medical/Treatment Diagnosis Information:  ·  Hist LBP, froz shldr     Physician Information:       Patient is Post-Op [] Yes   [] No     DOS:           18   Subjective Feeling good no complaints Doing well, been using my recumbent bike at home Feel good no changes My right shoulder is a little stiff today, think I slept on it wrong Shoulder is a little stiff, have a large keloidal scar on the back of my right shoulder and I wonder if that is restricting my motion Shoulder is feeling better today Feel good today             Weeks Post-Op                    Objective Still working on trunk stab  General strength improvements Improvements noted in trunk stab and hip disassociation    Improvements noted in trunk stab Still working on trunk stab, good ham flexibility Upper trap tightness, scap elevates with shldr flex Trunk stab improvements noted Still working on trunk stab, good ham flexibility,              Goals Pilates conditioning for spine stab and maintenance Pilates conditioning for spine stab and maintenance Pilates conditioning for spine stab and maintenance Pilates conditioning for spine stab and maintenance Pilates conditioning for spine stab and scap stab maintenance Pilates conditioning for spine stab and maintenance Pilates conditioning for spine stab and maintenance             Reformer Exercises 2R1G walks, raises w/U str  2R1G U sq w/domenica  2R1G plie 3R walks, raises w/U str  3R U sq w/domenica  3R plie 3R walks, raises w/U str  3R U sq w/domenica  3R plie    2R1G walks, raises w/U str  2R1G U sq w/domenica  3R plie 2R1G walks, raises w/U str  2R1G U sq w/domenica  2R1G plie  Peroneal stretch 2R1G walks, raises w/U str  2R1G U sq stretch    Hams,hip add,pirif,lunge   Standing quad stretch    Hams,hip add,pirif,lunge   Standing quad stretch      1R mermaid         Standing quad stretch  1R mermaid 1R mermaid                        Other   HEP Bridge, monster walks, wall sq,  standing quad str HEP Bridge, monster walks, wall sq,  standing quad str OH stretch on wall                                   Summary/Comments                                           Electronically signed by:  Patrick Keller ATC

## 2019-01-02 ENCOUNTER — TREATMENT (OUTPATIENT)
Dept: PHYSICAL THERAPY | Age: 49
End: 2019-01-02

## 2019-01-09 ENCOUNTER — TREATMENT (OUTPATIENT)
Dept: PHYSICAL THERAPY | Age: 49
End: 2019-01-09

## 2019-01-16 ENCOUNTER — TREATMENT (OUTPATIENT)
Dept: PHYSICAL THERAPY | Age: 49
End: 2019-01-16

## 2019-01-23 ENCOUNTER — TREATMENT (OUTPATIENT)
Dept: PHYSICAL THERAPY | Age: 49
End: 2019-01-23

## 2019-01-30 ENCOUNTER — TREATMENT (OUTPATIENT)
Dept: PHYSICAL THERAPY | Age: 49
End: 2019-01-30

## 2019-02-06 ENCOUNTER — TREATMENT (OUTPATIENT)
Dept: PHYSICAL THERAPY | Age: 49
End: 2019-02-06

## 2019-02-13 ENCOUNTER — TREATMENT (OUTPATIENT)
Dept: PHYSICAL THERAPY | Age: 49
End: 2019-02-13

## 2019-02-20 ENCOUNTER — TREATMENT (OUTPATIENT)
Dept: PHYSICAL THERAPY | Age: 49
End: 2019-02-20

## 2019-03-01 ENCOUNTER — TREATMENT (OUTPATIENT)
Dept: PHYSICAL THERAPY | Age: 49
End: 2019-03-01

## 2019-03-06 ENCOUNTER — TREATMENT (OUTPATIENT)
Dept: PHYSICAL THERAPY | Age: 49
End: 2019-03-06

## 2019-03-15 ENCOUNTER — TREATMENT (OUTPATIENT)
Dept: PHYSICAL THERAPY | Age: 49
End: 2019-03-15

## 2019-03-15 PROCEDURE — MISCPILATES PILATES CLASS

## 2019-03-20 ENCOUNTER — TREATMENT (OUTPATIENT)
Dept: PHYSICAL THERAPY | Age: 49
End: 2019-03-20

## 2019-03-20 PROCEDURE — MISCPILATES PILATES CLASS

## 2019-03-27 ENCOUNTER — TREATMENT (OUTPATIENT)
Dept: PHYSICAL THERAPY | Age: 49
End: 2019-03-27

## 2019-03-27 PROCEDURE — MISCPILATES PILATES CLASS

## 2019-04-03 ENCOUNTER — TREATMENT (OUTPATIENT)
Dept: PHYSICAL THERAPY | Age: 49
End: 2019-04-03

## 2019-04-03 PROCEDURE — MISCPILATES PILATES CLASS

## 2019-04-03 NOTE — FLOWSHEET NOTE
[address]  Phone: [phone]  Fax [phone]      Date:  4/3/2019    Patient Name:  Kaitlin Albarado    :  1970  MRN: [id number]  Restrictions/Precautions:    Medical/Treatment Diagnosis Information:  ·  Hist LBP, froz shldr     Physician Information:       Patient is Post-Op [] Yes   [] No     DOS:           3/1/19 3/6/19 3/15/19 3/20/19 3/27/19 4/3/19    Subjective Doing well No changes been feeling good My neck stiffened up the other day but a little better today Feeling better today Running late, want to lose weight, things come and go and my knee cap is bothering me My shoulder hurt so bad in the middle of the night I felt nauseus, got up and then fainted            Rec;  Dr Perez Else for neck eval    Weeks Post-Op               [address] for next package   2/7 3/7 4/7 5/7 6/7 7/7   Objective Still working on trunk stab  General strength improvements Improvements noted in trunk stab and hip disassociation    Improvements noted in trunk stab Still working on trunk stab, good ham flexibility Upper trap tightness, scap elevates with shldr flex Trunk stab improvements noted Still working on trunk stab, good ham flexibility,              Goals Pilates conditioning for spine stab and maintenance Pilates conditioning for spine stab and maintenance Pilates conditioning for spine stab and maintenance Pilates conditioning for spine stab and maintenance Pilates conditioning for spine stab and scap stab maintenance Pilates conditioning for spine stab and maintenance Pilates conditioning for spine stab and maintenance             Reformer Exercises 2R1G walks, raises w/U str  2R1G U sq w/domenica  2R1G plie 3R walks, raises w/U str  3R U sq w/domenica  3R plie 3R walks, raises w/U str  3R U sq w/domenica  3R plie    2R1G walks, raises w/U str  2R1G U sq w/domenica  3R plie 2R1G walks, raises w/U str  2R1G U sq w/domenica  2R1G plie  Peroneal stretch    2R1G walks, raises w/U str  2R1G U sq w/domenica 40/40  2R1G plie   Pelvic Stabilization   1R^1B #6 20/20/30 1R^1Y #5 20/20/30 1R^1B #5 20/20/30 1R1B #5 20/20/30 1R1B #6 1R1B                    2R 1 arm stand leg press 1s 2R 1 arm stand leg press             Trunk Stabilization 1R1B 90/90, leg lifts, scissor, knee ext  1R1B flex, obl 2R 90/90, leg lifts, scissor, knee ext  1R1B flex, obl 1R1B 90/90, leg lifts, scissor, knee ext  1R1B flex, obl    1R1B 90/90, leg lifts, scissor, knee ext  1R1B flex, obl 1R1B 90/90, leg lifts, scissor, knee ext  1R1B flex, obl  SCAP STAB  1R1B 90/90, leg lifts, scissor, knee ext  1R1B flex, obl                                 Hip Disassociation 2R1B ll,v,[place],ir/er,frog 2R1B ll,v,[place],ir/er,frog, U w/abd 2R1B ll,v,[place],ir/er,frog 2R1B ll,v,[place],ir/er,frog 2R1B ll,v,[place],ir/er,frog, U w/abd 2R1B ll,v,[place],ir/er,frog, U w/abd 2R1B ll,v,[place],ir/er,frog, U w/abd              1R1B stand leg press 1 arm 1R1B stand leg press 1 arm 1R1B stand leg press 1 arm                  Scapular Stabilization 1R1B #6 1R1B #6 1R1B #5 1R1B #5   1R1B 6#      Upper trap stretch Shoulder wall stretch, IR stretch with towel Seated:2R1B row, biceps          1R1B RC, ext               Thoracic Mobility 2R Bridge from heels  2R Bridge from toes 2R Bridge from heels  2R Bridge from toes 2R Bridge from heels w/ext  2R Bridge from toes w/ext 2R Bridge from heels w/ext  2R Bridge from toes w/ext 2R Bridge from heels w/ext  2R Bridge from toes w/ext  2R Bridge from heels w/ext  2R Bridge from toes w/ext      Seated:2R1B row, biceps   1R1B RC, ext                              General ROM Hams,hip add,pirif,lunge, gastroc, soleus Hams,hip add,pirif,lunge    Shoulder IR stretch w/strap Hams,hip add,pirif,lunge Hams,hip add,pirif,lunge   Standing quad stretch    Hams,hip add,pirif,lunge   Standing quad stretch    Hams,hip add,pirif,lunge   Standing quad stretch    Hams,hip add,pirif,lunge   Standing quad stretch      1R mermaid Standing quad stretch  1R mermaid                         Other   HEP Bridge, monster walks, wall sq,  standing quad str HEP Bridge, monster walks, wall sq,  standing quad str OH stretch on wall [place] L upper trap                                  Summary/Comments                                           Electronically signed by:  Jude Rice ATC

## 2019-04-08 ENCOUNTER — OFFICE VISIT (OUTPATIENT)
Dept: ORTHOPEDIC SURGERY | Age: 49
End: 2019-04-08
Payer: COMMERCIAL

## 2019-04-08 VITALS
DIASTOLIC BLOOD PRESSURE: 78 MMHG | WEIGHT: 169.97 LBS | SYSTOLIC BLOOD PRESSURE: 117 MMHG | BODY MASS INDEX: 25.18 KG/M2 | HEART RATE: 76 BPM | HEIGHT: 69 IN

## 2019-04-08 DIAGNOSIS — M50.30 DDD (DEGENERATIVE DISC DISEASE), CERVICAL: ICD-10-CM

## 2019-04-08 DIAGNOSIS — M79.18 CERVICAL MYOFASCIAL PAIN SYNDROME: ICD-10-CM

## 2019-04-08 DIAGNOSIS — M25.512 LEFT SHOULDER PAIN, UNSPECIFIED CHRONICITY: Primary | ICD-10-CM

## 2019-04-08 DIAGNOSIS — M54.2 NECK PAIN: ICD-10-CM

## 2019-04-08 PROCEDURE — 99203 OFFICE O/P NEW LOW 30 MIN: CPT | Performed by: FAMILY MEDICINE

## 2019-04-08 RX ORDER — METHYLPREDNISOLONE 4 MG/1
TABLET ORAL
Qty: 21 KIT | Refills: 0 | Status: SHIPPED | OUTPATIENT
Start: 2019-04-08 | End: 2020-09-29

## 2019-04-08 RX ORDER — ROSUVASTATIN CALCIUM 20 MG/1
20 TABLET, COATED ORAL
COMMUNITY
Start: 2018-10-09

## 2019-04-08 RX ORDER — MELOXICAM 15 MG/1
15 TABLET ORAL DAILY
Qty: 30 TABLET | Refills: 3 | Status: SHIPPED | OUTPATIENT
Start: 2019-04-08 | End: 2020-09-29

## 2019-04-08 NOTE — PROGRESS NOTES
Chief Complaint  Shoulder Pain (OPNP LEFT SHOULDER- PREVIOUSLY SEEN PJF 3/2016 ) and Neck Pain (NECK STIFFNESS)      Initial consultation left greater than right cervical trapezial and mild shoulder pain with remote history of adhesive capsulitis right shoulder    History of Present Illness:  Raleigh Russo is a 47 y.o. 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 She states that she has been having the insidious onset of pain which started in her left cervical paraspinals trapezius and parascapular region and was associated initially with some numbness and tingling into the extensor portion of her left arm on about 3/21/2019. There is no real history of actual injury or new activity. She was having soreness and stiffness range between a 4-7 out of 10 which he treated with ice and some very sparing dosings of ibuprofen and activity modification. With relative rest, her symptoms did slightly improve until about 3/31/2019 when she had the insidious onset of pain and spasm after getting up to go to the bathroom at night. Should her pain was substantial to the point where she believe she became vasovagal and since that time is having primarily left sided cervical paraspinal tightness and stiffness but is no longer really complaining of pain radiating to the left arm. She states that her shoulder symptoms are different from her remote history of right shoulder is capsulitis and she is maintaining reasonable shoulder motion. She has not most focal weakness locking or catching and has no worsening pain with coughing and sneezing.   She was evaluated by Parminder Shoemaker prompting today's consultation. She's been taking very low doses of ibuprofen and is being seen today for orthopedic and sports consultation with imaging. Medical History    Patient's medications, allergies, past medical, surgical, social and family histories were reviewed and updated as appropriate. Review of Systems     Relevant review of systems reviewed 4/8/2019 and available in the patient's chart    Vital Signs    Vitals:    04/08/19 1124   BP: 117/78   Pulse: 76       General Exam:      Constitutional: Patient is adequately groomed with no evidence of malnutrition  DTRs: Deep tendon reflexes are intact  Mental Status: The patient is oriented to time, place and person. The patient's mood and affect are appropriate. Vascular: Examination reveals no swelling or calf tenderness. Peripheral pulses are palpable and 2+. Neurological: The patient has good coordination. There is no weakness or sensory deficit. Cervical Spine Examination    Inspection:  There is no high-grade deformity or soft tissue swelling or palpable spasm\" she is very tight with regard to cervical paraspinal musculature left greater than the right cervical paraspinals and superior rhomboids. Palpation:  Have several left greater than right trapezial trigger points and soreness with left greater than right cervical paraspinal palpation. She does not really report much in the way of actual shoulder tenderness. Rang of Motion:  She does have slight limitations in cervical motion secondary to stiffness. Strength: There does not appear to be loss cervical isometric strength there does not appear to be evidence of focal upper extremity motor loss. Special Tests:  Minimal discomfort with axial load testing. Some discomfort with facet loading. I will call her Spurling's and Mcmahon testing negative. Negative screening shoulder evaluation with reasonable motion cuff strength. Negative impingement testing.        Skin: There are no rashes, ulcerations or lesions. Distal motor sensory and vascular exams intact. Gait: Fluid gait. Additional Comments:            Additional Examinations:      Right Upper Extremity:  Examination of the right upper extremity does not show any tenderness, deformity or injury. Range of motion is unremarkable. There is no gross instability. There are no rashes, ulcerations or lesions. Strength and tone are normal.  Left Upper Extremity: Examination of the left upper extremity does not show any tenderness, deformity or injury. Range of motion is unremarkable. There is no gross instability. There are no rashes, ulcerations or lesions. Strength and tone are normal.              Diagnostic Test Findings:    AP and lateral cervical spine films are obtained today and show underlying C5 6 moderate degenerative disc changes with loss of cervical lordosis. Left shoulder true AP outlet and axillary films are obtained today and show mild downsloping to her chromium without evidence of high-grade degenerative changes. Assessment:  #1.  2+ weeks status post symptomatic cervical myofascial pain with underlying cervical degenerative disc disease and a resolved left upper extremity numbness. #2.  History of shoulder pain with reasonably well maintained motion and remote history of right shoulder adhesive capsulitis    Impression:       Encounter Diagnoses   Name Primary?     Left shoulder pain, unspecified chronicity Yes    Neck pain     Cervical myofascial pain syndrome     DDD (degenerative disc disease), cervical        Office Procedures:       Orders Placed This Encounter   Procedures    XR SHOULDER LEFT (MIN 2 VIEWS)     Standing Status:   Future     Number of Occurrences:   1     Standing Expiration Date:   4/8/2020    XR CERVICAL SPINE (2-3 VIEWS)     Standing Status:   Future     Number of Occurrences:   1     Standing Expiration Date:   4/8/2020    Ambulatory referral to Physical

## 2019-04-08 NOTE — PATIENT INSTRUCTIONS
If you're currently taking an anti-inflammatory such as advil, aleve, ibuprofen, diclofenac, naproxen, meloxicam, celebrex, or nabumetone, please stop. Take Medrol first for 6 days. This is a steroid pack. Flip the package over to the foil side and the directions will tell you to start with 6 pills the first day, 5 pills the second day, etc. Please do not take any other anti-inflammatories with the medrol dose sherine as this can upset your stomach. If something else is needed, you may take extra strength tylenol.      Once you are finished with the medrol, then you may re-start or start your anti-inflammatory: MELOXICAM 1X/DAY

## 2019-04-10 ENCOUNTER — TREATMENT (OUTPATIENT)
Dept: PHYSICAL THERAPY | Age: 49
End: 2019-04-10

## 2019-04-10 PROCEDURE — MISCPILATES PILATES CLASS

## 2019-04-10 NOTE — FLOWSHEET NOTE
U sq w/domenica  2R1G plie  Peroneal stretch    2R1G walks, raises w/U str  2R1G U sq w/domenica 40/40  2R1G plie   Pelvic Stabilization   1R^1B #6 20/20/30 1R^1Y #5 20/20/30 1R^1B #5 20/20/30 1R1B #5 20/20/30 1R1B #6 1R1B                    2R 1 arm stand leg press 1s 2R 1 arm stand leg press             Trunk Stabilization 1R1B 90/90, leg lifts, scissor, knee ext  1R1B flex, obl 2R 90/90, leg lifts, scissor, knee ext  1R1B flex, obl 1R1B 90/90, leg lifts, scissor, knee ext  1R1B flex, obl    1R1B 90/90, leg lifts, scissor, knee ext  1R1B flex, obl 1R1B 90/90, leg lifts, scissor, knee ext  1R1B flex, obl  SCAP STAB  1R1B 90/90, leg lifts, scissor, knee ext  1R1B flex, obl                                 Hip Disassociation 2R1B ll,v,[place],ir/er,frog 2R1B ll,v,[place],ir/er,frog, U w/abd 2R1B ll,v,[place],ir/er,frog 2R1B ll,v,[place],ir/er,frog 2R1B ll,v,[place],ir/er,frog, U w/abd 2R1B ll,v,[place],ir/er,frog, U w/abd 2R1B ll,v,[place],ir/er,frog, U w/abd              1R1B stand leg press 1 arm 1R1B stand leg press 1 arm 1R1B stand leg press 1 arm                  Scapular Stabilization 1R1B #6 1R1B #6 1R1B #5 1R1B #5   1R1B 6#      Upper trap stretch Shoulder wall stretch, IR stretch with towel Seated:2R1B row, biceps          1R1B RC, ext               Thoracic Mobility 2R Bridge from heels  2R Bridge from toes 2R Bridge from heels  2R Bridge from toes 2R Bridge from heels w/ext  2R Bridge from toes w/ext 2R Bridge from heels w/ext  2R Bridge from toes w/ext 2R Bridge from heels w/ext  2R Bridge from toes w/ext  2R Bridge from heels w/ext  2R Bridge from toes w/ext      Seated:2R1B row, biceps   1R1B RC, ext                              General ROM Hams,hip add,pirif,lunge, gastroc, soleus Hams,hip add,pirif,lunge    Shoulder IR stretch w/strap Hams,hip add,pirif,lunge Hams,hip add,pirif,lunge   Standing quad stretch    Hams,hip add,pirif,lunge   Standing quad stretch    Hams,hip add,pirif,lunge   Standing quad stretch Hams,hip add,pirif,lunge   Standing quad stretch      1R mermaid         Standing quad stretch  1R mermaid                         Other   HEP Bridge, monster walks, wall sq,  standing quad str HEP Bridge, monster walks, wall sq,  standing quad str OH stretch on wall [place] L upper trap                                  Summary/Comments                                           Electronically signed by:  Carlito Reid ATC

## 2019-04-12 ENCOUNTER — EVALUATION (OUTPATIENT)
Dept: PHYSICAL THERAPY | Age: 49
End: 2019-04-12
Payer: COMMERCIAL

## 2019-04-12 DIAGNOSIS — M54.2 CERVICALGIA: Primary | ICD-10-CM

## 2019-04-12 PROCEDURE — 97161 PT EVAL LOW COMPLEX 20 MIN: CPT | Performed by: PHYSICAL THERAPIST

## 2019-04-12 PROCEDURE — 97110 THERAPEUTIC EXERCISES: CPT | Performed by: PHYSICAL THERAPIST

## 2019-04-12 PROCEDURE — 97140 MANUAL THERAPY 1/> REGIONS: CPT | Performed by: PHYSICAL THERAPIST

## 2019-04-12 NOTE — PLAN OF CARE
[facility], [facility]     Physical Therapy Certification    Dear Referring Practitioner: Laure Jacinto MD,    We had the pleasure of evaluating the following patient for physical therapy services at [address] [place] [address]. A summary of our findings can be found in the initial assessment below. This includes our plan of care. If you have any questions or concerns regarding these findings, please do not hesitate to contact me at the office phone number checked above. Thank you for the referral.       Physician Signature:_______________________________Date:__________________  By signing above (or electronic signature), therapists plan is approved by physician    Patient: Rashard Paula   : 1970   MRN: [id number]  Referring Physician: Referring Practitioner: Laure Jacinto MD      Evaluation Date: 2019      Medical Diagnosis Information:  Diagnosis: Neck pain M54.2; Cx DDD M50.30; Cx myofascial pain M79.18   Treatment Diagnosis: Neck pain M54.2                                         Insurance information: PT Insurance Information: BCBS    Precautions/ Contra-indications:  Latex Allergy:  [x]NO      []YES  Preferred Language for Healthcare:   [x]English       []other:    SUBJECTIVE: Patient stated complaint:Pt reports onset of L sided neck pain about 3 weeks ago. States she woke up with significant pain and stiffness that later resolved in a few days. About a week after, she experienced a severe, painful spasm in her L UT that cause nausea and fainting d/t pain. She states that pain has since subsided. She saw MD who took xrays and was diagnosed with DDD C5-6. She has been on a prednisone pack. She has not been using ice or heat recently. She is L handed.     Relevant Medical History:hx L adhesive capsulitis  Functional Disability Index:  NDI 12%    Pain Scale: 1-2/10  Easing factors: activity modification (avoid lifting weights)  Provocative factors: driving dizziness   []Abnormal reflexes/sensation/myotomal/dermatomal deficits   []Decreased DCF control or ability to hold head up   [x]Decreased RC/scapular/core strength and neuromuscular control    [x]Decreased UE functional strength   []other:      Functional Activity Limitations (from functional questionnaire and intake)   []Reduced ability to tolerate prolonged functional positions   []Reduced ability or difficulty with changes of positions or transfers between positions   [x]Reduced ability to maintain good posture and demonstrate good body mechanics with sitting, bending, and lifting   [x] Reduced ability or tolerance with driving and/or computer work   [x]Reduced ability to perform lifting, reaching, carrying tasks   []Reduced ability to concentrate   [x]Reduced ability to sleep    [x]Reduced ability to tolerate any impact through UE or spine   []Reduced ability to ambulate prolonged functional periods/distances   []other:    Participation Restrictions   []Reduced participation in self care activities   [x]Reduced participation in home management activities   []Reduced participation in work activities   [x]Reduced participation in social activities. [x]Reduced participation in sport/recreational activities.     Classification/Subgrouping:   [x]signs/symptoms consistent with neck pain with mobility deficits     []signs/symptoms consistent with neck pain with movement coordinated impairments    []signs/symptoms consistent with neck pain with radiating pain    []signs/symptoms consistent with neck pain with headaches (cervicogenic)    []Signs/symptoms consistent with nerve root involvement including myotome & dermatome dysfunction   [x]sign/symptoms consistent with facet dysfunction of cervical and thoracic spine    []signs/symptoms consistent suggesting central cord compression/UMN syndromes   []signs/symptoms consistent with discogenic cervical pain   []signs/symptoms consistent with rib Weeks:  Interventions:  [x]  Therapeutic exercise including: strength training, ROM, for cervical spine,scapula, core and Upper extremity, including postural re-education. [x]  NMR activation and proprioception for Deep cervical flexors, periscapular and RC muscles and Core, including postural re-education. [x]  Manual therapy as indicated for C/T spine, ribs, Soft tissue to include: Dry Needling/IASTM, STM, PROM, Gr I-IV mobilizations, manipulation. [x] Modalities as needed that may include: thermal agents, E-stim, Biofeedback, US, iontophoresis as indicated  [x] Patient education on joint protection, postural re-education, activity modification, progression of HEP. HEP instruction: Handout given (see scanned forms)    GOALS:  Patient stated goal: decrease pain, tightness, return to regular exercise    Therapist goals for Patient:   Short Term Goals: To be achieved in: 2 weeks  1. Independent in HEP and progression per patient tolerance, in order to prevent re-injury. 2. Patient will have a decrease in pain to facilitate improvement in movement, function, and ADLs as indicated by Functional Deficits. Long Term Goals: To be achieved in: 8 weeks  1. Disability index score of 6% or less for the NDI to assist with reaching prior level of function. 2. Patient will demonstrate increased AROM to [facility] of cervical/thoracic spine to allow for proper joint functioning as indicated by patients Functional Deficits. 3. Patient will demonstrate an increase in postural awareness and control and activation of  Deep cervical stabilizers to allow for proper functional mobility as indicated by patients Functional Deficits. 4. Patient will return to driving/ functional activities without increased symptoms or restriction.    5. Patient will be able to participate in Pilates workouts without lifting limitations/restriction. (patient specific functional goal)       Electronically signed by:  Shyann Steinberg, PT, DPT [id number]

## 2019-04-12 NOTE — FLOWSHEET NOTE
[address] [facility]      Physical Therapy Daily Treatment Note  Date:  2019    Patient Name:  Lucrecia Yo    :  1970  MRN: [id number]  Restrictions/Precautions:    Medical/Treatment Diagnosis Information:  · Diagnosis: Neck pain M54.2; Cx DDD M50.30;  Cx myofascial pain M79.18  · Treatment Diagnosis: Neck pain P96.2  Insurance/Certification information:  PT Insurance Information: BCBS  Physician Information:  Referring Practitioner: Kleber Poe MD  Plan of care signed (Y/N):     Date of Patient follow up with Physician:     G-Code (if applicable):      Date G-Code Applied:         Progress Note: [x]  Yes  []  No  Next due by: Visit #10      Latex Allergy:  [x]NO      []YES  Preferred Language for Healthcare:   [x]English       []other:    Visit # Insurance Allowable Requires auth   1     []no        []yes:     Pain level:  1-210     SUBJECTIVE:  See eval    OBJECTIVE: See eval  Observation:   Test measurements:      RESTRICTIONS/PRECAUTIONS:     Exercises/Interventions:   Therapeutic Ex Sets/sec Reps Notes   UBE      Seated scap squeeze x15     T- band Row/pinch Blue x10     T- band ext pinch Blue x10     T- band ER activation      UT stretch 3x30\" B     Levator stretch 3x30\" B     Isometric at wall      Quadruped w cerv retract      Front plank      Side plank      Chin tuck 10x5\" supine     Chin tuck w lift      Chin tuck w rotation      Doorway pec minor S 3x20\" R/L           Manual Intervention      STM B UT, LS; SOR; L lat glides gr III; manual distraction 2x20\"; prone B U PAs T spine gr III 14'                                         NMR re-education      T-spine Ext- foam roll      Chin tucks  See above                       Traction                      Therapeutic Exercise and NMR EXR  [x] (55613) Provided verbal/tactile cueing for activities related to strengthening, flexibility, endurance, ROM  for improvements in cervical, postural, scapular, scapulothoracic and UE control with self care, reaching, carrying, lifting, house/yardwork, driving/computer work.    [] (24547) Provided verbal/tactile cueing for activities related to improving balance, coordination, kinesthetic sense, posture, motor skill, proprioception  to assist with cervical, scapular, scapulothoracic and UE control with self care, reaching, carrying, lifting, house/yardwork, driving/computer work. Therapeutic Activities:    [] (32596 or 55302) Provided verbal/tactile cueing for activities related to improving balance, coordination, kinesthetic sense, posture, motor skill, proprioception and motor activation to allow for proper function of cervical, scapular, scapulothoracic and UE control with self care, carrying, lifting, driving/computer work.      Home Exercise Program:    [x] (82143) Reviewed/Progressed HEP activities related to strengthening, flexibility, endurance, ROM of cervical, scapular, scapulothoracic and UE control with self care, reaching, carrying, lifting, house/yardwork, driving/computer work  [] (89144) Reviewed/Progressed HEP activities related to improving balance, coordination, kinesthetic sense, posture, motor skill, proprioception of cervical, scapular, scapulothoracic and UE control with self care, reaching, carrying, lifting, house/yardwork, driving/computer work      Manual Treatments:  PROM / STM / Oscillations-Mobs:  G-I, II, III, IV (PA's, Inf., Post.)  [x] (22625) Provided manual therapy to mobilize soft tissue/joints of cervical/CT, scapular GHJ and UE for the purpose of decreasing headache, modulating pain, promoting relaxation,  increasing ROM, reducing/eliminating soft tissue swelling/inflammation/restriction, improving soft tissue extensibility and allowing for proper ROM for normal function with self care, reaching, carrying, lifting, house/yardwork, driving/computer work    Modalities: None, pt declined     Charges:  Timed Code Treatment Minutes: 30   Total Treatment Minutes: 60     [x] EVAL (LOW) 58961 (typically 20 minutes face-to-face)  [] EVAL (MOD) 39860 (typically 30 minutes face-to-face)  [] EVAL (HIGH) 45805 (typically 45 minutes face-to-face)  [] RE-EVAL     [x] TV(01415) x  1   [] IONTO  [] NMR (41328) x      [] VASO  [x] Manual (77778) x  1    [] Other:  [] TA x       [] Mech Traction (91946)  [] ES(attended) (03023)      [] ES (un) (36639):     GOALS:  Short Term Goals: To be achieved in: 2 weeks  1. Independent in HEP and progression per patient tolerance, in order to prevent re-injury. 2. Patient will have a decrease in pain to facilitate improvement in movement, function, and ADLs as indicated by Functional Deficits.     Long Term Goals: To be achieved in: 8 weeks  1. Disability index score of 6% or less for the NDI to assist with reaching prior level of function. 2. Patient will demonstrate increased AROM to [facility] of cervical/thoracic spine to allow for proper joint functioning as indicated by patients Functional Deficits. 3. Patient will demonstrate an increase in postural awareness and control and activation of  Deep cervical stabilizers to allow for proper functional mobility as indicated by patients Functional Deficits. 4. Patient will return to driving/ functional activities without increased symptoms or restriction. 5. Patient will be able to participate in Pilates workouts without lifting limitations/restriction. (patient specific functional goal)             Progression Towards Functional goals:  [] Patient is progressing as expected towards functional goals listed. [] Progression is slowed due to complexities listed. [] Progression has been slowed due to co-morbidities.   [x] Plan just implemented, too soon to assess goals progression  [] Other:     ASSESSMENT:  See eval    Treatment/Activity Tolerance:  [x] Patient tolerated treatment well [] Patient limited by fatique  [] Patient limited by pain  [] Patient limited by other medical complications  [] Other:     Prognosis: [x] Good [] Fair  [] Poor    Patient Requires Follow-up: [x] Yes  [] No    PLAN: See eval  [] Continue per plan of care [] Alter current plan (see comments)  [x] Plan of care initiated [] Hold pending MD visit [] Discharge    Electronically signed by: Joaquín Doe, [facility], DPT [id number]

## 2019-04-17 ENCOUNTER — TREATMENT (OUTPATIENT)
Dept: PHYSICAL THERAPY | Age: 49
End: 2019-04-17

## 2019-04-17 PROCEDURE — MISCPILATES PILATES CLASS

## 2019-04-17 NOTE — FLOWSHEET NOTE
scissor, knee ext   2R 90/90, leg lifts, scissor, knee ext  1R1B flex, obl 1R1B 90/90, leg lifts, scissor, knee ext  1R1B flex, obl    1R1B 90/90, leg lifts, scissor, knee ext  1R1B flex, obl 1R1B 90/90, leg lifts, scissor, knee ext  1R1B flex, obl  SCAP STAB  1R1B 90/90, leg lifts, scissor, knee ext  1R1B flex, obl                                 Hip Disassociation 2R1B ll,v,[place],ir/er,frog 2R1B ll,v,[place],ir/er,frog, U w/abd 2R1B ll,v,[place],ir/er,frog 2R1B ll,v,[place],ir/er,frog 2R1B ll,v,[place],ir/er,frog, U w/abd 2R1B ll,v,[place],ir/er,frog, U w/abd 2R1B ll,v,[place],ir/er,frog, U w/abd               1R1B stand leg press 1 arm 1R1B stand leg press 1 arm                  Scapular Stabilization 1R1B seated bi tri, low row, shldr ext 1R1B #6 1R1B #5 1R1B #5   1R1B 6#      Upper trap stretch Shoulder wall stretch, IR stretch with towel Seated:2R1B row, biceps          1R1B RC, ext               Thoracic Mobility  2R Bridge from heels  2R Bridge from toes 2R Bridge from heels w/ext  2R Bridge from toes w/ext 2R Bridge from heels w/ext  2R Bridge from toes w/ext 2R Bridge from heels w/ext  2R Bridge from toes w/ext  2R Bridge from heels w/ext  2R Bridge from toes w/ext      Seated:2R1B row, biceps   1R1B RC, ext                              General ROM Hams,hip add,pirif,lunge, gastroc, soleus Hams,hip add,pirif,lunge    Shoulder IR stretch w/strap Hams,hip add,pirif,lunge Hams,hip add,pirif,lunge   Standing quad stretch    Hams,hip add,pirif,lunge   Standing quad stretch    Hams,hip add,pirif,lunge   Standing quad stretch    Hams,hip add,pirif,lunge   Standing quad stretch      1R mermaid         Standing quad stretch  1R mermaid                         Other   HEP Bridge, monster walks, wall sq,  standing quad str HEP Bridge, monster walks, wall sq,  standing quad str OH stretch on wall [place] L upper trap                                  Summary/Comments                                           Electronically signed by:  Rahul Peterson, ATC

## 2019-04-24 ENCOUNTER — TREATMENT (OUTPATIENT)
Dept: PHYSICAL THERAPY | Age: 49
End: 2019-04-24

## 2019-04-24 ENCOUNTER — TREATMENT (OUTPATIENT)
Dept: PHYSICAL THERAPY | Age: 49
End: 2019-04-24
Payer: COMMERCIAL

## 2019-04-24 DIAGNOSIS — M54.2 CERVICALGIA: Primary | ICD-10-CM

## 2019-04-24 PROCEDURE — 97110 THERAPEUTIC EXERCISES: CPT | Performed by: PHYSICAL THERAPIST

## 2019-04-24 PROCEDURE — MISCPILATES PILATES CLASS

## 2019-04-24 PROCEDURE — 97140 MANUAL THERAPY 1/> REGIONS: CPT | Performed by: PHYSICAL THERAPIST

## 2019-04-24 NOTE — DISCHARGE SUMMARY
[address] [facility]       Physical Therapy Discharge  Date: 2019        Patient Name:  Jonelle Sosa    :  1970  MRN: [id number]  Referring Physician: Giuliana Painter pain, Cx DDD                        ICD Code:M54.2  [] Surgical [x] Conservative   Therapy Diagnosis/Practice Pattern:F      Number of Comorbidities:  [x]0     []1-2    []3+  Total number of visits: 2   Reporting Period:   Beginning Date:19   End Date:19    OBJECTIVE  Test used Initial score Discharge Score   Pain Summary Pain scale 2/10 0/10   Functional questionnaire NDI 12% 0%   Functional Testing            ROM cx all planes AROM  WFL         Strength UE myotomes  5/5                     Test/tests used to determine % limitation:NDI  Actual Score used to drive % [id number]:7%    Treatment to date:  [x] Therapeutic Exercise    [x] Modalities:  [] Therapeutic Activity             []Ultrasound            []Electrical Stimulation  [] Gait Training     []Cervical Traction    [] Lumbar Traction  [x] Neuromuscular Re-education [x] Cold/hotpack         []Iontophoresis  [x] Instruction in HEP      Other:  [x] Manual Therapy                   []                       ?           []   Assessment:  [x] All Goals were achieved. [] The following goals were achieved (#'s):  [] The following goals were not achieved for the following reasons:/assessmen of improvement as it relates to each goal:    Plan of Care:  [x] Discharge from Therapy Services due to:    Reason for Discharge:   [x] All goals achieved    [] Patient having surgery  [] Physician discontinued therapy  [] Insurance/Financial Limitations [] Patient did not return for follow up visits [] Home program/1 visit only   [] No subjective or objective improvement [] Plateaued   [] Patient was unable to adhere to the plan of care established at evaluation. [] Referred back to physician for re-evaluation and did not return.

## 2019-04-24 NOTE — FLOWSHEET NOTE
[address] [facility]      Physical Therapy Daily Treatment Note  Date:  2019    Patient Name:  Anel Ambrosio    :  1970  MRN: [id number]  Restrictions/Precautions:    Medical/Treatment Diagnosis Information:  · Diagnosis: Neck pain M54.2; Cx DDD M50.30; Cx myofascial pain M79.18  · Treatment Diagnosis: Neck pain F67.3  Insurance/Certification information:  PT Insurance Information: BCBS  Physician Information:  Referring Practitioner: Tirso Roger MD  Plan of care signed (Y/N):     Date of Patient follow up with Physician:     G-Code (if applicable):      Date G-Code Applied:         Progress Note: [x]  Yes  []  No  Next due by: Visit #10      Latex Allergy:  [x]NO      []YES  Preferred Language for Healthcare:   [x]English       []other:    Visit # Insurance Allowable Requires auth   2 60    []no        []yes:     Pain level:  0/10     SUBJECTIVE:  Pt states that her neck feels great! Says she isn't having any more pain. No problems with HEP.     OBJECTIVE:   Observation: AROM WFL cx spine without c/o pain  Test measurements:  See DC note    RESTRICTIONS/PRECAUTIONS:     Exercises/Interventions:   Therapeutic Ex Sets/sec Reps Notes   UBE      Seated scap squeeze     T- band Row/pinch     T- band ext pinch Blue 2x10     T- band ER activation      UT stretch 3x30\" B     Levator stretch 3x30\" B     Isometric at wall      Quadruped w cerv retract      Front plank      Side plank      Chin tuck      Chin tuck w lift      Chin tuck w rotation      Doorway pec minor S 3x20\" R/L     No money 2x10x5\"  Added to hep   Wall ER at 90/90 2x10  Added to hep  Attempted wall angels but pt unable d/t lack of ER   Seated ER table S 10x10\"  Added to hep   Manual Intervention      STM B UT, LS; SOR; L lat glides gr III; manual distraction 2x20\"; prone B U PAs T spine gr III 14'                                         NMR re-education      T-spine Ext- foam roll      Chin tucks  See above                       Traction                      Therapeutic Exercise and NMR EXR  [x] (80565) Provided verbal/tactile cueing for activities related to strengthening, flexibility, endurance, ROM  for improvements in cervical, postural, scapular, scapulothoracic and UE control with self care, reaching, carrying, lifting, house/yardwork, driving/computer work.    [] (72443) Provided verbal/tactile cueing for activities related to improving balance, coordination, kinesthetic sense, posture, motor skill, proprioception  to assist with cervical, scapular, scapulothoracic and UE control with self care, reaching, carrying, lifting, house/yardwork, driving/computer work. Therapeutic Activities:    [] (53846 or 32080) Provided verbal/tactile cueing for activities related to improving balance, coordination, kinesthetic sense, posture, motor skill, proprioception and motor activation to allow for proper function of cervical, scapular, scapulothoracic and UE control with self care, carrying, lifting, driving/computer work.      Home Exercise Program:    [x] (33661) Reviewed/Progressed HEP activities related to strengthening, flexibility, endurance, ROM of cervical, scapular, scapulothoracic and UE control with self care, reaching, carrying, lifting, house/yardwork, driving/computer work  [] (44107) Reviewed/Progressed HEP activities related to improving balance, coordination, kinesthetic sense, posture, motor skill, proprioception of cervical, scapular, scapulothoracic and UE control with self care, reaching, carrying, lifting, house/yardwork, driving/computer work      Manual Treatments:  PROM / STM / Oscillations-Mobs:  G-I, II, III, IV (PA's, Inf., Post.)  [x] (54128) Provided manual therapy to mobilize soft tissue/joints of cervical/CT, scapular GHJ and UE for the purpose of decreasing headache, modulating pain, promoting relaxation,  increasing ROM, reducing/eliminating soft tissue swelling/inflammation/restriction, improving soft tissue extensibility and allowing for proper ROM for normal function with self care, reaching, carrying, lifting, house/yardwork, driving/computer work    Modalities: P x15' cervical    Charges:  Timed Code Treatment Minutes: 38   Total Treatment Minutes: 53     [] EVAL (LOW) 35695 (typically 20 minutes face-to-face)  [] EVAL (MOD) 88355 (typically 30 minutes face-to-face)  [] EVAL (HIGH) 45954 (typically 45 minutes face-to-face)  [] RE-EVAL     [x] UC(79770) x  2   [] IONTO  [] NMR (98698) x      [] VASO  [x] Manual (10665) x  1    [] Other:  [] TA x       [] Mech Traction (14527)  [] ES(attended) (29534)      [] ES (un) (89594):     GOALS:  Short Term Goals: To be achieved in: 2 weeks  1. Independent in HEP and progression per patient tolerance, in order to prevent re-injury. 2. Patient will have a decrease in pain to facilitate improvement in movement, function, and ADLs as indicated by Functional Deficits.     Long Term Goals: To be achieved in: 8 weeks  1. Disability index score of 6% or less for the NDI to assist with reaching prior level of function. 2. Patient will demonstrate increased AROM to [facility] of cervical/thoracic spine to allow for proper joint functioning as indicated by patients Functional Deficits. 3. Patient will demonstrate an increase in postural awareness and control and activation of  Deep cervical stabilizers to allow for proper functional mobility as indicated by patients Functional Deficits. 4. Patient will return to driving/ functional activities without increased symptoms or restriction. 5. Patient will be able to participate in Pilates workouts without lifting limitations/restriction. (patient specific functional goal)             Progression Towards Functional goals:  [x] Patient is progressing as expected towards functional goals listed. [] Progression is slowed due to complexities listed.   [] Progression has been slowed due to co-morbidities. [] Plan just implemented, too soon to assess goals progression  [] Other:     ASSESSMENT:  Progressed HEP with new handout given. Pt is doing well and has met all goals. DC to I HEP and Pilates. See DC note.     Treatment/Activity Tolerance:  [x] Patient tolerated treatment well [] Patient limited by fatique  [] Patient limited by pain  [] Patient limited by other medical complications   [] Other:     Prognosis: [x] Good [] Fair  [] Poor    Patient Requires Follow-up: [x] Yes  [] No    PLAN: See eval  [] Continue per plan of care [] Alter current plan (see comments)  [] Plan of care initiated [] Hold pending MD visit [x] Discharge    Electronically signed by: Garrett Renee, [address], DPT [id number]

## 2019-04-24 NOTE — FLOWSHEET NOTE
[address]  Phone: [phone]  Fax [phone]      Date:  2019    Patient Name:  Janusz Fitzpatrick    :  1970  MRN: [id number]  Restrictions/Precautions:    Medical/Treatment Diagnosis Information:  ·  Hist LBP, froz shldr     Physician Information:       Patient is Post-Op [] Yes   [] No     DOS:           19        Subjective Saw Dr Samm Raza, saw Renetta Earing for PT and have been doing my exercises My neck and shoulder are much better                  Weeks Post-Op             1/7     2/7 3/7 4/7 5/7 6/7 7/7   Objective Still working on trunk stab  General strength improvements  Difficulty with scap stab Improvements noted in trunk stab and hip disassociation    Improvements noted in trunk stab Still working on trunk stab, good ham flexibility Upper trap tightness, scap elevates with shldr flex Trunk stab improvements noted Still working on trunk stab, good ham flexibility,              Goals Pilates conditioning for spine stab and maintenance Pilates conditioning for spine stab and maintenance Pilates conditioning for spine stab and maintenance Pilates conditioning for spine stab and maintenance Pilates conditioning for spine stab and scap stab maintenance Pilates conditioning for spine stab and maintenance Pilates conditioning for spine stab and maintenance             Reformer Exercises 2R standing leg press  2R1B U calf raise w/str 2R U raises w/U str   3R walks, raises w/U str  3R U sq w/domenica  3R plie    2R1G walks, raises w/U str  2R1G U sq w/domenica  3R plie 2R1G walks, raises w/U str  2R1G U sq w/domenica  2R1G plie  Peroneal stretch    2R1G walks, raises w/U str  2R1G U sq w/domenica 40/40  2R1G plie   Pelvic Stabilization   1R^1B 30 1R1B stand ab/ad 1R^1B #5 30 1R1B #5 30 1R1B #6 1R1B                Box: [facility]    2R 1 arm stand leg press 1s 2R 1 arm stand leg press             Trunk Stabilization 1RTrA variat: 90/90, leg lifts, scissor, knee ext   2R 90/90, leg lifts, scissor  No trunk flexion 1R1B 90/90, leg lifts, scissor, knee ext  1R1B flex, obl    1R1B 90/90, leg lifts, scissor, knee ext  1R1B flex, obl 1R1B 90/90, leg lifts, scissor, knee ext  1R1B flex, obl  SCAP STAB  1R1B 90/90, leg lifts, scissor, knee ext  1R1B flex, obl                                 Hip Disassociation 2R1B ll,v,[place],ir/er,frog 2R1B ll,v,[place],ir/er,frog, U w/abd 2R1B ll,v,[place],ir/er,frog 2R1B ll,v,[place],ir/er,frog 2R1B ll,v,[place],ir/er,frog, U w/abd 2R1B ll,v,[place],ir/er,frog, U w/abd 2R1B ll,v,[place],ir/er,frog, U w/abd               2R stand leg press 1R1B stand leg press 1 arm                  Scapular Stabilization 1R1B seated bi tri, low row, shldr ext 1R1B 1R1B #5 1R1B #5   1R1B 6#      Upper trap stretch Shoulder wall stretch, IR stretch with towel Seated:2R1B row, biceps          1R1B RC, ext               Thoracic Mobility  2R Bridge from heels  2R Bridge from toes 2R Bridge from heels w/ext  2R Bridge from toes w/ext 2R Bridge from heels w/ext  2R Bridge from toes w/ext 2R Bridge from heels w/ext  2R Bridge from toes w/ext  2R Bridge from heels w/ext  2R Bridge from toes w/ext      Seated:2R1B row, biceps   1R1B RC, ext                              General ROM Hams,hip add,pirif,lunge, gastroc, soleus Hams,hip add,pirif,lunge    Shoulder IR stretch w/strap Hams,hip add,pirif,lunge Hams,hip add,pirif,lunge   Standing quad stretch    Hams,hip add,pirif,lunge   Standing quad stretch    Hams,hip add,pirif,lunge   Standing quad stretch    Hams,hip add,pirif,lunge   Standing quad stretch      1R mermaid         Standing quad stretch  1R mermaid                         Other   HEP Bridge, monster walks, wall sq,  standing quad str HEP Bridge, monster walks, wall sq,  standing quad str OH stretch on wall [place] [facility] upper trap                                  Summary/Comments                                           Electronically signed by:

## 2019-05-01 ENCOUNTER — TREATMENT (OUTPATIENT)
Dept: PHYSICAL THERAPY | Age: 49
End: 2019-05-01

## 2019-05-01 PROCEDURE — MISCPILATES PILATES CLASS

## 2019-05-01 NOTE — FLOWSHEET NOTE
[address]  Phone: [phone]  Fax [phone]      Date:  2019    Patient Name:  Lucrecia Yo    :  1970  MRN: [id number]  Restrictions/Precautions:    Medical/Treatment Diagnosis Information:  ·  Hist LBP, froz shldr     Physician Information:       Patient is Post-Op [] Yes   [] No     DOS:           19       Subjective Saw Dr Kleber Poe, saw Gordon Belle for PT and have been doing my exercises My neck and shoulder are much better My L shoulder is bothering me some today                 Weeks Post-Op             1/7     2/7 3/7 4/7 5/7 6/7 7/7   Objective Still working on trunk stab  General strength improvements  Difficulty with scap stab Improvements noted in trunk stab and hip disassociation    Improvements noted in trunk stab Still working on trunk stab, good ham flexibility Upper trap tightness, scap elevates with shldr flex Trunk stab improvements noted Still working on trunk stab, good ham flexibility,              Goals Pilates conditioning for spine stab and maintenance Pilates conditioning for spine stab and maintenance Pilates conditioning for spine stab and maintenance Pilates conditioning for spine stab and maintenance Pilates conditioning for spine stab and scap stab maintenance Pilates conditioning for spine stab and maintenance Pilates conditioning for spine stab and maintenance             Reformer Exercises 2R standing leg press  2R1B U calf raise w/str 2R U raises w/U str   3R walks, raises w/U str  3R U sq w/domenica  3R plie    2R1G walks, raises w/U str  2R1G U sq w/domenica  3R plie 2R1G walks, raises w/U str  2R1G U sq w/domenica  2R1G plie  Peroneal stretch    2R1G walks, raises w/U str  2R1G U sq w/domenica 40/40  2R1G plie   Pelvic Stabilization   1R^1B 30 1R1B stand ab/ad 1R^1B #5 /30 1R1B #5 /30 1R1B #6 1R1B                Box: [facility]    2R 1 arm stand leg press 1s 2R 1 arm stand leg press Electronically signed by:  Michelle Armendariz, ATC

## 2019-05-08 ENCOUNTER — TREATMENT (OUTPATIENT)
Dept: PHYSICAL THERAPY | Age: 49
End: 2019-05-08

## 2019-05-08 ENCOUNTER — OFFICE VISIT (OUTPATIENT)
Dept: ORTHOPEDIC SURGERY | Age: 49
End: 2019-05-08
Payer: COMMERCIAL

## 2019-05-08 VITALS — BODY MASS INDEX: 25.18 KG/M2 | WEIGHT: 169.97 LBS | HEIGHT: 69 IN

## 2019-05-08 DIAGNOSIS — M54.2 NECK PAIN: ICD-10-CM

## 2019-05-08 DIAGNOSIS — M25.512 LEFT SHOULDER PAIN, UNSPECIFIED CHRONICITY: ICD-10-CM

## 2019-05-08 DIAGNOSIS — M50.30 DDD (DEGENERATIVE DISC DISEASE), CERVICAL: ICD-10-CM

## 2019-05-08 DIAGNOSIS — M79.18 CERVICAL MYOFASCIAL PAIN SYNDROME: Primary | ICD-10-CM

## 2019-05-08 PROCEDURE — MISCPILATES PILATES CLASS

## 2019-05-08 PROCEDURE — 99213 OFFICE O/P EST LOW 20 MIN: CPT | Performed by: FAMILY MEDICINE

## 2019-05-08 NOTE — FLOWSHEET NOTE
[address]  Phone: [phone]  Fax [phone]      Date:  2019    Patient Name:  Edgar Ortiz    :  1970  MRN: [id number]  Restrictions/Precautions:    Medical/Treatment Diagnosis Information:  ·  Hist LBP, froz shldr     Physician Information:       Patient is Post-Op [] Yes   [] No     DOS:           19      Subjective Saw Dr Candice Cintron, saw Enrrique Akers for PT and have been doing my exercises My neck and shoulder are much better My L shoulder is bothering me some today Feeling better, I see Dr Candice Cintron today for the followup                Weeks Post-Op             1/7     2/7 3/7 4/7 5/7 6/7 7/7   Objective Still working on trunk stab  General strength improvements  Difficulty with scap stab Improvements noted in trunk stab and hip disassociation    Improvements noted in trunk stab Still working on trunk stab, good ham flexibility Upper trap tightness, scap elevates with shldr flex Trunk stab improvements noted Still working on trunk stab, good ham flexibility,              Goals Pilates conditioning for spine stab and maintenance Pilates conditioning for spine stab and maintenance Pilates conditioning for spine stab and maintenance Pilates conditioning for spine stab and maintenance Pilates conditioning for spine stab and scap stab maintenance Pilates conditioning for spine stab and maintenance Pilates conditioning for spine stab and maintenance             Reformer Exercises 2R standing leg press  2R1B U calf raise w/str 2R U raises w/U str   3R walks, raises w/U str  3R U sq w/domenica  3R plie    2R1G walks, raises w/U str  2R1G U sq w/domenica  3R plie 2R1G walks, raises w/U str  2R1G U sq w/domenica  2R1G plie  Peroneal stretch    2R1G walks, raises w/U str  2R1G U sq w/domenica 40/40  2R1G plie   Pelvic Stabilization   1R^1B 30 1R1B stand ab/ad 1R^1B #5 30 1R1B #5  1R1B #6 1R1B                Box: [address]    2R 1 arm stand leg press 1s 2R 1 arm stand leg press             Trunk Stabilization 1RTrA variat: 90/90, leg lifts, scissor, knee ext   2R 90/90, leg lifts, scissor  No trunk flexion 1R1B 90/90, leg lifts, scissor, knee ext  1R1B flex, obl    1R1B 90/90, leg lifts, scissor, knee ext  1R1B flex, obl 1R1B 90/90, leg lifts, scissor, knee ext  1R1B flex, obl  SCAP STAB  1R1B 90/90, leg lifts, scissor, knee ext  1R1B flex, obl                                 Hip Disassociation 2R1B ll,v,[place],ir/er,frog 2R1B ll,v,[place],ir/er,frog, U w/abd 2R1B ll,v,[place],ir/er,frog 2R1B ll,v,[place],ir/er,frog 2R1B ll,v,[place],ir/er,frog, U w/abd 2R1B ll,v,[place],ir/er,frog, U w/abd 2R1B ll,v,[place],ir/er,frog, U w/abd               2R stand leg press 1R1B stand leg press 1 arm                  Scapular Stabilization 1R1B seated bi tri, low row, shldr ext 1R1B 1R1B #5 1R1B #5   1R1B 6#      Upper trap stretch Shoulder wall stretch, IR stretch with towel Seated:2R1B row, biceps          1R1B RC, ext               Thoracic Mobility  2R Bridge from heels  2R Bridge from toes 2R Bridge from heels w/ext  2R Bridge from toes w/ext 2R Bridge from heels w/ext  2R Bridge from toes w/ext 2R Bridge from heels w/ext  2R Bridge from toes w/ext  2R Bridge from heels w/ext  2R Bridge from toes w/ext      Seated:2R1B row, biceps   1R1B RC, ext                              General ROM Hams,hip add,pirif,lunge, gastroc, soleus Hams,hip add,pirif,lunge    Shoulder IR stretch w/strap Hams,hip add,pirif,lunge Hams,hip add,pirif,lunge   Standing quad stretch    Hams,hip add,pirif,lunge   Standing quad stretch    Hams,hip add,pirif,lunge   Standing quad stretch    Hams,hip add,pirif,lunge   Standing quad stretch      1R mermaid         Standing quad stretch  1R mermaid                         Other   HEP Bridge, monster walks, wall sq,  standing quad str HEP Bridge, monster walks, wall sq,  standing quad str OH stretch on wall [facility] upper trap Summary/Comments                                           Electronically signed by:  Delores Clark ATC

## 2019-05-08 NOTE — PROGRESS NOTES
Chief Complaint  Shoulder Pain (CK L SHOULDER)      Follow-up left greater than right cervical trapezial and mild shoulder pain with remote history of adhesive capsulitis right shoulder    History of Present Illness:  Melissa Nathan is a 47 y.o. 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 She states that she has been having the insidious onset of pain which started in her left cervical paraspinals trapezius and parascapular region and was associated initially with some numbness and tingling into the extensor portion of her left arm on about 3/21/2019. There is no real history of actual injury or new activity. She was having soreness and stiffness range between a 4-7 out of 10 which he treated with ice and some very sparing dosings of ibuprofen and activity modification. With relative rest, her symptoms did slightly improve until about 3/31/2019 when she had the insidious onset of pain and spasm after getting up to go to the bathroom at night. Should her pain was substantial to the point where she believe she became vasovagal and since that time is having primarily left sided cervical paraspinal tightness and stiffness but is no longer really complaining of pain radiating to the left arm. She states that her shoulder symptoms are different from her remote history of right shoulder is capsulitis and she is maintaining reasonable shoulder motion. She has not most focal weakness locking or catching and has no worsening pain with coughing and sneezing. She was evaluated by [facility] prompting today's consultation.   She's been taking very low doses of ibuprofen and is being seen today for orthopedic and sports consultation with imaging. She was last seen in the office on 4/8/2019 was started on conservative treatment for her cervical myofascial pain with underlying cervical degenerative disc disease and arm numbness. She has had complete resolution of her numbness and has had a couple of sessions of physical therapy at the [facility] with improvements in her overall motion. Symmetrically she is 95% improved. She did get benefit and felt great when she was on her Medrol pack but did have an elevation in her bilirubin and therefore she has not tried meloxicam.  She has gotten back into her Pilates and her shoulder motion is improved as well and she is no longer having pain with elevation. Denies locking or catching of that she has noticed some relatively painless crepitation over the left a.c. joint. No active locking catching or radicular symptoms noted. She is very pleased with her progress thus far. Medical History    Patient's medications, allergies, past medical, surgical, social and family histories were reviewed and updated as appropriate. Review of Systems     Relevant review of systems reviewed 4/8/2019 and available in the patient's chart    Vital Signs    There were no vitals filed for this visit. General Exam:      Constitutional: Patient is adequately groomed with no evidence of malnutrition  DTRs: Deep tendon reflexes are intact  Mental Status: The patient is oriented to time, place and person. The patient's mood and affect are appropriate. Vascular: Examination reveals no swelling or calf tenderness. Peripheral pulses are palpable and 2+. Neurological: The patient has good coordination. There is no weakness or sensory deficit.     Cervical Spine Examination    Inspection:  There is no high-grade deformity or soft tissue swelling or palpable spasm and she does appear to be somewhat less tight with regard to cervical paraspinal musculature left greater than Arcadio Bustamante    This dictation was performed with a verbal recognition program (DRAGON) and it was checked for errors. It is possible that there are still dictated errors within this office note. If so, please bring any errors to my attention for an addendum. All efforts were made to ensure that this office note is accurate.

## 2019-05-15 ENCOUNTER — TREATMENT (OUTPATIENT)
Dept: PHYSICAL THERAPY | Age: 49
End: 2019-05-15

## 2019-05-15 PROCEDURE — MISCPILATES PILATES CLASS

## 2019-05-15 NOTE — FLOWSHEET NOTE
[address]  Phone: [phone]  Fax [phone]      Date:  5/15/2019    Patient Name:  Kari Barrios    :  1970  MRN: [id number]  Restrictions/Precautions:    Medical/Treatment Diagnosis Information:  ·  Hist LBP, froz shldr     Physician Information:       Patient is Post-Op [] Yes   [] No     DOS:           4/17/19 4/24/19 5/1/19 5/8/19 5/15/19     Subjective Saw Dr Akin Phelps, saw Yanci Allen for PT and have been doing my exercises My neck and shoulder are much better My L shoulder is bothering me some today Feeling better, I see Dr Akin Phelps today for the followup Saw Dr Akin Phelps he says I am doing well               Weeks Post-Op             1/7     2/7 3/7 4/7 5/7 6/7 7/7   Objective Still working on trunk stab  General strength improvements  Difficulty with scap stab Improvements noted in trunk stab and hip disassociation    Improvements noted in trunk stab Still working on trunk stab, good ham flexibility Upper trap tightness, scap elevates with shldr flex Trunk stab improvements noted Still working on trunk stab, good ham flexibility,              Goals Pilates conditioning for spine stab and maintenance Pilates conditioning for spine stab and maintenance Pilates conditioning for spine stab and maintenance Pilates conditioning for spine stab and maintenance Pilates conditioning for spine stab and scap stab maintenance Pilates conditioning for spine stab and maintenance Pilates conditioning for spine stab and maintenance             Reformer Exercises 2R standing leg press  2R1B U calf raise w/str 2R U raises w/U str   3R walks, raises w/U str  3R U sq w/domenica  3R plie    2R1G walks, raises w/U str  2R1G U sq w/domenica  3R plie 2R standing leg press  2R1B U calf raise w/str    2R1G walks, raises w/U str  2R1G U sq w/domenica 40/40  2R1G plie   Pelvic Stabilization   1R^1B  1R1B stand ab/ad 1R^1B #5  1R1B #5  1R1B #4 1R1B Box: [facility] [place] [facility]    2R 1 arm stand leg press 1s 2R 1 arm stand leg press             Trunk Stabilization 1RTrA variat: 90/90, leg lifts, scissor, knee ext   2R 90/90, leg lifts, scissor  No trunk flexion 1R1B 90/90, leg lifts, scissor, knee ext  1R1B flex, obl    1R1B 90/90, leg lifts, scissor, knee ext  1R1B flex, obl 1G 90/90, leg lifts, scissor, knee ext  1R1B flex, obl  SCAP STAB  1R1B 90/90, leg lifts, scissor, knee ext  1R1B flex, obl                                 Hip Disassociation 2R1B ll,v,[place],ir/er,frog 2R1B ll,v,[place],ir/er,frog, U w/abd 2R1B ll,v,[place],ir/er,frog 2R1B ll,v,[place],ir/er,frog 2R1B ll,v,[place],ir/er,frog, U w/abd 2R1B ll,v,[place],ir/er,frog, U w/abd 2R1B ll,v,[place],ir/er,frog, U w/abd               2R stand leg press 1R1B stand leg press 1 arm                  Scapular Stabilization 1R1B seated bi tri, low row, shldr ext 1R1B 1R1B #5 1R1B #5   1R1B 6#      Upper trap stretch Shoulder wall stretch, IR stretch with towel Seated:2R1B row, biceps          1R1B RC, ext               Thoracic Mobility  2R Bridge from heels  2R Bridge from toes 2R Bridge from heels w/ext  2R Bridge from toes w/ext 2R Bridge from heels w/ext  2R Bridge from toes w/ext 2R Bridge from heels w/ext  2R Bridge from toes w/ext  2R Bridge from heels w/ext  2R Bridge from toes w/ext      Seated:2R1B row, biceps   1R1B RC, ext                              General ROM Hams,hip add,pirif,lunge, gastroc, soleus Hams,hip add,pirif,lunge    Shoulder IR stretch w/strap Hams,hip add,pirif,lunge Hams,hip add,pirif,lunge   Standing quad stretch    Hams,hip add,pirif,lunge   Standing quad stretch    Hams,hip add,pirif,lunge   Standing quad stretch    Hams,hip add,pirif,lunge   Standing quad stretch      1R mermaid         Standing quad stretch  1R mermaid                         Other   HEP Bridge, monster walks, wall sq,  standing quad str HEP Bridge, monster walks, wall sq,  standing quad str OH stretch on wall [facility] L upper trap Summary/Comments                                           Electronically signed by:  Kaz Cardenas ATC

## 2019-05-24 ENCOUNTER — TREATMENT (OUTPATIENT)
Dept: PHYSICAL THERAPY | Age: 49
End: 2019-05-24

## 2019-05-24 PROCEDURE — MISCPILATES PILATES CLASS

## 2019-05-24 NOTE — FLOWSHEET NOTE
[address]  Phone: [phone]  Fax [phone]      Date:  2019    Patient Name:  Buddy Manning    :  1970  MRN: [id number]  Restrictions/Precautions:    Medical/Treatment Diagnosis Information:  ·  Hist LBP, froz shldr     Physician Information:       Patient is Post-Op [] Yes   [] No     DOS:           4/17/19 4/24/19 5/1/19 5/8/19 5/15/19 5/24/19    Subjective Saw Dr Idania Swenson, saw Stephie Murcia for PT and have been doing my exercises My neck and shoulder are much better My L shoulder is bothering me some today Feeling better, I see Dr Idania Swenson today for the followup Saw Dr Idania Swenson he says I am doing well Feeling much better              Weeks Post-Op             1/7     2/7 3/7 4/7 5/7 6/7 7/7   Objective Still working on trunk stab  General strength improvements  Difficulty with scap stab Improvements noted in trunk stab and hip disassociation    Improvements noted in trunk stab Still working on trunk stab, good ham flexibility Upper trap tightness, scap elevates with shldr flex Trunk stab improvements noted Still working on trunk stab, good ham flexibility,              Goals Pilates conditioning for spine stab and maintenance Pilates conditioning for spine stab and maintenance Pilates conditioning for spine stab and maintenance Pilates conditioning for spine stab and maintenance Pilates conditioning for spine stab and scap stab maintenance Pilates conditioning for spine stab, scap stab, and maintenance Pilates conditioning for spine stab and maintenance             Reformer Exercises 2R standing leg press  2R1B U calf raise w/str 2R U raises w/U str   3R walks, raises w/U str  3R U sq w/domenica  3R plie    2R1G walks, raises w/U str  2R1G U sq w/domenica  3R plie 2R standing leg press  2R1B U calf raise w/str    2R1G walks, raises w/U str  2R1G U sq w/domenica 40/40  2R1G plie   Pelvic Stabilization   1R^1B  1R1B stand ab/ad 1R^1B #5  1R1B #5 20/20/30 1R1B #4 1R1B                [place] [facility]    2R 1 arm stand leg press 1R1B 1 arm stand leg [id number] 1 arm stand leg press            Trunk Stabilization 1RTrA variat: 90/90, leg lifts, scissor, knee ext   2R 90/90, leg lifts, scissor  No trunk flexion 1R1B 90/90, leg lifts, scissor, knee ext  1R1B flex, obl    1R1B 90/90, leg lifts, scissor, knee ext  1R1B flex, obl 1G 90/90, leg lifts, scissor, knee ext  1R1B flex, obl  SCAP STAB 1R1B 90/90, leg lifts, scissor, knee ext  1R1B flex, [id number] 90/90, leg lifts, scissor, knee ext  1R1B flex, obl                                 Hip Disassociation 2R1B ll,v,[place],ir/er,frog 2R1B ll,v,[place],ir/er,frog, U w/abd 2R1B ll,v,[place],ir/er,frog 2R1B ll,v,[place],ir/er,frog 2R1B ll,v,[place],ir/er,frog, U w/abd 2R1B ll,v,[place],ir/er,frog, U w/abd 2R1B ll,v,[place],ir/er,frog, U w/abd               2R stand leg press 1R1B stand leg press 1 arm                  Scapular Stabilization 1R1B seated bi tri, low row, shldr ext 1R1B 1R1B #5 1R1B #5   1R1B 6#      Upper trap stretch Shoulder wall stretch, IR stretch with towel Seated:2R1B row, biceps          1R1B RC, ext               Thoracic Mobility  2R Bridge from heels  2R Bridge from toes 2R Bridge from heels w/ext  2R Bridge from toes w/ext 2R Bridge from heels w/ext  2R Bridge from toes w/ext 2R Bridge from heels w/ext  2R Bridge from toes w/ext  2R Bridge from heels w/ext  2R Bridge from toes w/ext      Seated:2R1B row, biceps   1R1B RC, ext                              General ROM Hams,hip add,pirif,lunge, gastroc, soleus Hams,hip add,pirif,lunge    Shoulder IR stretch w/strap Hams,hip add,pirif,lunge Hams,hip add,pirif,lunge   Standing quad stretch    Hams,hip add,pirif,lunge   Standing quad stretch    Hams,hip add,pirif,lunge   Standing quad stretch    Hams,hip add,pirif,lunge   Standing quad stretch      1R mermaid         Standing quad stretch  1R mermaid                         Other   HEP Bridge, monster walks, wall sq, standing quad str HEP Bridge, monster walks, wall sq,  standing quad str OH stretch on wall [place] L upper trap                                  Summary/Comments                                           Electronically signed by:  Gabi Vogel, ATC

## 2019-06-19 ENCOUNTER — TREATMENT (OUTPATIENT)
Dept: PHYSICAL THERAPY | Age: 49
End: 2019-06-19

## 2019-06-19 PROCEDURE — MISCPILATES PILATES CLASS

## 2019-06-19 NOTE — FLOWSHEET NOTE
[address]  Phone: [phone]  Fax [phone]      Date:  2019    Patient Name:  Anel Ambrosio    :  1970  MRN: [id number]  Restrictions/Precautions:    Medical/Treatment Diagnosis Information:  ·  Hist LBP, froz shldr     Physician Information:       Patient is Post-Op [] Yes   [] No     DOS:           4/17/19 4/24/19 5/1/19 5/8/19 5/15/19 5/24/19 6/19/19   Subjective Saw Dr Tirso Roger, saw Ulises Blunt for PT and have been doing my exercises My neck and shoulder are much better My L shoulder is bothering me some today Feeling better, I see Dr Tirso Roger today for the followup Saw Dr Tirso Roger he says I am doing well Feeling much better Been on vacation and ready to get back to working out             [place]             1/7     2/7 3/7 4/7 5/7 6/7 7/7   Objective Still working on trunk stab  General strength improvements  Difficulty with scap stab Improvements noted in trunk stab and hip disassociation    Improvements noted in trunk stab Still working on trunk stab, good ham flexibility Upper trap tightness, scap elevates with shldr flex Trunk stab improvements noted Still working on trunk stab, good ham flexibility,              Goals Pilates conditioning for spine stab and maintenance Pilates conditioning for spine stab and maintenance Pilates conditioning for spine stab and maintenance Pilates conditioning for spine stab and maintenance Pilates conditioning for spine stab and scap stab maintenance Pilates conditioning for spine stab, scap stab, and maintenance Pilates conditioning for spine stab and maintenance             Reformer Exercises 2R standing leg press  2R1B U calf raise w/str 2R U raises w/U str   3R walks, raises w/U str  3R U sq w/domenica  3R plie    2R1G walks, raises w/U str  2R1G U sq w/domenica  3R plie 2R standing leg press  2R1B U calf raise w/str    2R1G walks, raises w/U str  2R1G U sq w/domenica 40/40  2R1G plie   Pelvic Stabilization 1R^1B 20/20/30 1R1B stand ab/ad 1R^1B #5 20/20/30 1R1B #5 20/20/30 1R1B #4 1R1B                Box: HSC    2R 1 arm stand leg press 1R1B 1 arm stand leg [id number] 1 arm stand leg press            Trunk Stabilization 1RTrA variat: 90/90, leg lifts, scissor, knee ext   2R 90/90, leg lifts, scissor  No trunk flexion 1R1B 90/90, leg lifts, scissor, knee ext  1R1B flex, obl    1R1B 90/90, leg lifts, scissor, knee ext  1R1B flex, obl 1G 90/90, leg lifts, scissor, knee ext  1R1B flex, obl  SCAP STAB 1R1B 90/90, leg lifts, scissor, knee ext  1R1B flex, [id number] 90/90, leg lifts, scissor, knee ext  1R1B flex, obl                                 Hip Disassociation 2R1B ll,v,[place],ir/er,frog 2R1B ll,v,[place],ir/er,frog, U w/abd 2R1B ll,v,[place],ir/er,frog 2R1B ll,v,[place],ir/er,frog 2R1B ll,v,[place],ir/er,frog, U w/abd 2R1B ll,v,[place],ir/er,frog, U w/abd 2R1B ll,v,[place],ir/er,frog, U w/abd               2R stand leg press 1R1B stand leg press 1 arm                  Scapular Stabilization 1R1B seated bi tri, low row, shldr ext 1R1B 1R1B #5 1R1B #5   1R1B 6#      Upper trap stretch Shoulder wall stretch, IR stretch with towel Seated:2R1B row, biceps          1R1B RC, ext               Thoracic Mobility  2R Bridge from heels  2R Bridge from toes 2R Bridge from heels w/ext  2R Bridge from toes w/ext 2R Bridge from heels w/ext  2R Bridge from toes w/ext 2R Bridge from heels w/ext  2R Bridge from toes w/ext  2R Bridge from heels w/ext  2R Bridge from toes w/ext      Seated:2R1B row, biceps   1R1B RC, ext                              General ROM Hams,hip add,pirif,lunge, gastroc, soleus Hams,hip add,pirif,lunge    Shoulder IR stretch w/strap Hams,hip add,pirif,lunge Hams,hip add,pirif,lunge   Standing quad stretch    Hams,hip add,pirif,lunge   Standing quad stretch    Hams,hip add,pirif,lunge   Standing quad stretch    Hams,hip add,pirif,lunge   Standing quad stretch      1R mermaid         Standing quad stretch  1R mermaid

## 2019-06-28 ENCOUNTER — TREATMENT (OUTPATIENT)
Dept: PHYSICAL THERAPY | Age: 49
End: 2019-06-28

## 2019-06-28 PROCEDURE — MISCPILATES PILATES CLASS

## 2019-06-28 NOTE — FLOWSHEET NOTE
scissor  No trunk flexion 1R1B 90/90, leg lifts, scissor, knee ext  1R1B flex, obl    1R1B 90/90, leg lifts, scissor, knee ext  1R1B flex, obl 1G 90/90, leg lifts, scissor, knee ext  1R1B flex, obl  SCAP STAB 1R1B 90/90, leg lifts, scissor, knee ext  1R1B flex, [id number] 90/90, leg lifts, scissor, knee ext  1R1B flex, obl                                 Hip Disassociation 2R1B ll,v,[place],ir/er,frog 2R1B ll,v,[place],ir/er,frog, U w/abd 2R1B ll,v,[place],ir/er,frog 2R1B ll,v,[place],ir/er,frog 2R1B ll,v,[place],ir/er,frog, U w/abd 2R1B ll,v,[place],ir/er,frog, U w/abd 2R1B ll,v,[place],ir/er,frog, U w/abd               2R stand leg press 1R1B stand leg press 1 arm                  Scapular Stabilization 1R1B seated bi tri, low row, shldr ext 1R1B 1R1B #5 1R1B #5   1R1B 6#      Upper trap stretch Shoulder wall stretch, IR stretch with towel Seated:2R1B row, biceps          1R1B RC, ext               Thoracic Mobility  2R Bridge from heels  2R Bridge from toes 2R Bridge from heels w/ext  2R Bridge from toes w/ext 2R Bridge from heels w/ext  2R Bridge from toes w/ext 2R Bridge from heels w/ext  2R Bridge from toes w/ext  2R Bridge from heels w/ext  2R Bridge from toes w/ext      Seated:2R1B row, biceps   1R1B RC, ext                              General ROM Hams,hip add,pirif,lunge, gastroc, soleus Hams,hip add,pirif,lunge    Shoulder IR stretch w/strap Hams,hip add,pirif,lunge Hams,hip add,pirif,lunge   Standing quad stretch    Hams,hip add,pirif,lunge   Standing quad stretch    Hams,hip add,pirif,lunge   Standing quad stretch    Hams,hip add,pirif,lunge   Standing quad stretch      1R mermaid         Standing quad stretch  1R mermaid                         Other   HEP Bridge, monster walks, wall sq,  standing quad str HEP Bridge, monster walks, wall sq,  standing quad str OH stretch on wall [place] L upper trap                                  Summary/Comments                                           Electronically signed by:  Jacobo Kolb, ATC

## 2019-07-10 ENCOUNTER — TREATMENT (OUTPATIENT)
Dept: PHYSICAL THERAPY | Age: 49
End: 2019-07-10

## 2019-07-10 PROCEDURE — MISCPILATES PILATES CLASS

## 2019-07-17 ENCOUNTER — TREATMENT (OUTPATIENT)
Dept: PHYSICAL THERAPY | Age: 49
End: 2019-07-17

## 2019-07-17 PROCEDURE — MISCPILATES PILATES CLASS

## 2019-07-24 ENCOUNTER — TREATMENT (OUTPATIENT)
Dept: PHYSICAL THERAPY | Age: 49
End: 2019-07-24

## 2019-07-24 PROCEDURE — MISCPILATES PILATES CLASS

## 2019-07-24 NOTE — FLOWSHEET NOTE
[address]  Phone: [phone]  Fax [phone]      Date:  2019    Patient Name:  Sayra Delong    :  1970  MRN: [id number]  Restrictions/Precautions:    Medical/Treatment Diagnosis Information:  ·  Hist LBP, froz shldr     Physician Information:       Patient is Post-Op [] Yes   [] No     DOS:           6/28/19 7/10/19 7/17/19 7/24/19      Subjective Feel good running and have to leave early Feeling good went on the [employer] at [facility] and my neck and back did well Feeling fine No changes                Weeks Post-Op             1/7     2/7 3/7 4/7 5/7 6/7 7/7   Objective Still working on trunk stab  General strength improvements  Difficulty with scap stab Improvements noted in trunk stab and hip disassociation    Improvements noted in trunk stab Still working on trunk stab, good ham flexibility Upper trap tightness, scap elevates with shldr flex Trunk stab improvements noted Still working on trunk stab, good ham flexibility,              Goals Pilates conditioning for spine stab and maintenance Pilates conditioning for spine stab and maintenance Pilates conditioning for spine stab and maintenance Pilates conditioning for spine stab and maintenance Pilates conditioning for spine stab and scap stab maintenance Pilates conditioning for spine stab, scap stab, and maintenance Pilates conditioning for spine stab and maintenance             Reformer Exercises 2R standing leg press  2R1B U calf raise w/str 2R U raises w/U str   3R walks, raises w/U str  3R U sq w/domenica  3R plie    2R1G walks, raises w/U str  2R1G U sq w/domenica  3R plie 2R standing leg press  2R1B U calf raise w/str    2R1G walks, raises w/U str  2R1G U sq w/domenica 40/40  2R1G plie   Pelvic Stabilization   1R^1B  1R1B stand ab/ad 1R^1B #5 30 1R1B #5  1R1B #4 1R1B                Box: HSC    2R 1 arm stand leg press 1R1B 1 arm stand leg [id number] 1 arm

## 2019-08-02 ENCOUNTER — TREATMENT (OUTPATIENT)
Dept: PHYSICAL THERAPY | Age: 49
End: 2019-08-02

## 2019-08-02 PROCEDURE — MISCPILATES PILATES CLASS

## 2019-08-02 NOTE — FLOWSHEET NOTE
[address]  Phone: [phone]  Fax [phone]      Date:  2019    Patient Name:  Cheri Lebron    :  1970  MRN: [id number]  Restrictions/Precautions:    Medical/Treatment Diagnosis Information:  ·  Hist LBP, froz shldr     Physician Information:       Patient is Post-Op [] Yes   [] No     DOS:           6/28/19 7/10/19 7/17/19 7/24/19 8/2/19     Subjective Feel good running and have to leave early Feeling good went on the [employer] at [facility] and my neck and back did well Feeling fine No changes Doing well, feeling good today               Weeks Post-Op             1/7     2/7 3/7 4/7 5/7 6/7 7/7   Objective Still working on trunk stab  General strength improvements  Difficulty with scap stab Improvements noted in trunk stab and hip disassociation    Improvements noted in trunk stab Still working on trunk stab, good ham flexibility Upper trap tightness, scap elevates with shldr flex Trunk stab improvements noted Still working on trunk stab, good ham flexibility,              Goals Pilates conditioning for spine stab and maintenance Pilates conditioning for spine stab and maintenance Pilates conditioning for spine stab and maintenance Pilates conditioning for spine stab and maintenance Pilates conditioning for spine stab and scap stab maintenance Pilates conditioning for spine stab, scap stab, and maintenance Pilates conditioning for spine stab and maintenance             Reformer Exercises 2R standing leg press  2R1B U calf raise w/str 2R U raises w/U str   3R walks, raises w/U str  3R U sq w/domenica  3R plie    2R1G walks, raises w/U str  2R1G U sq w/domenica  3R plie 2R standing leg press  2R1B U calf raise w/str    2R1G walks, raises w/U str  2R1G U sq w/domenica 40/40  2R1G plie   Pelvic Stabilization   1R^1B /20/30 1R1B stand ab/ad 1R^1B #5 //30 1R1B #5 //30 1R1B #4 1R1B                Box: HSC    2R 1 arm stand leg press

## 2019-08-14 ENCOUNTER — TREATMENT (OUTPATIENT)
Dept: PHYSICAL THERAPY | Age: 49
End: 2019-08-14

## 2019-08-14 PROCEDURE — MISCPILATES PILATES CLASS

## 2019-08-21 ENCOUNTER — TREATMENT (OUTPATIENT)
Dept: PHYSICAL THERAPY | Age: 49
End: 2019-08-21

## 2019-08-21 PROCEDURE — MISCPILATES PILATES CLASS

## 2019-08-28 ENCOUNTER — TREATMENT (OUTPATIENT)
Dept: PHYSICAL THERAPY | Age: 49
End: 2019-08-28

## 2019-08-28 PROCEDURE — MISCPILATES PILATES CLASS

## 2019-08-28 NOTE — FLOWSHEET NOTE
[address]  Phone: [phone]  Fax [phone]      Date:  2019    Patient Name:  Maria G Farfan    :  1970  MRN: [id number]  Restrictions/Precautions:    Medical/Treatment Diagnosis Information:  ·  Hist LBP, froz shldr     Physician Information:       Patient is Post-Op [] Yes   [] No     DOS:           19         Subjective My L knee is bothering me a little               25 min late    Weeks Post-Op             1/7     2/7 3/7 4/7 5/7 6/7 7/7   Objective Still working on trunk stab  General strength improvements  Difficulty with scap stab   MMT hamstrings L    - [place] Improvements noted in trunk stab and hip disassociation    Improvements noted in trunk stab Still working on trunk stab, good ham flexibility Upper trap tightness, scap elevates with shldr flex Trunk stab improvements noted Still working on trunk stab, good ham flexibility,              Goals Pilates conditioning for spine stab and maintenance Pilates conditioning for spine stab and maintenance Pilates conditioning for spine stab and maintenance Pilates conditioning for spine stab and maintenance Pilates conditioning for spine stab and scap stab maintenance Pilates conditioning for spine stab, scap stab, and maintenance Pilates conditioning for spine stab and maintenance             Reformer Exercises 2R standing leg press  2R1B U calf raise w/str 2R U raises w/U str   3R walks, raises w/U str  3R U sq w/domenica  3R plie    2R1G walks, raises w/U str  2R1G U sq w/domenica  3R plie 2R standing leg press  2R1B U calf raise w/str    2R1G walks, raises w/U str  2R1G U sq w/domenica 40/40  2R1G plie   Pelvic Stabilization   1R^1B 30 1R1B stand ab/ad 1R^1B #5 30 1R1B #5 30 1R1B #4 1R1B                Box: HSC    2R 1 arm stand leg press 1R1B 1 arm stand leg [id number] 1 arm stand leg press            Trunk Stabilization 1RTrA variat: 90/90, leg lifts, scissor, Electronically signed by:  Champ Tyson, ATC

## 2019-09-04 ENCOUNTER — TREATMENT (OUTPATIENT)
Dept: PHYSICAL THERAPY | Age: 49
End: 2019-09-04

## 2019-09-04 PROCEDURE — MISCPILATES PILATES CLASS

## 2019-09-04 NOTE — FLOWSHEET NOTE
[address]  Phone: [phone]  Fax [phone]      Date:  2019    Patient Name:  Tanya Chiang    :  1970  MRN: [id number]  Restrictions/Precautions:    Medical/Treatment Diagnosis Information:  ·  Hist LBP, froz shldr     Physician Information:       Patient is Post-Op [] Yes   [] No     DOS:           19        Subjective My L knee is bothering me a little Feeling real good today              25 min late    Weeks Post-Op             1/7     2/7 3/7 4/7 5/7 6/7 7/7   Objective Still working on trunk stab  General strength improvements  Difficulty with scap stab   MMT hamstrings L 4/   - [place] Improvements noted in trunk stab and hip disassociation    Improvements noted in trunk stab Still working on trunk stab, good ham flexibility Upper trap tightness, scap elevates with shldr flex Trunk stab improvements noted Still working on trunk stab, good ham flexibility,              Goals Pilates conditioning for spine stab and maintenance Pilates conditioning for spine stab and maintenance Pilates conditioning for spine stab and maintenance Pilates conditioning for spine stab and maintenance Pilates conditioning for spine stab and scap stab maintenance Pilates conditioning for spine stab, scap stab, and maintenance Pilates conditioning for spine stab and maintenance             Reformer Exercises 2R standing leg press  2R1B U calf raise w/str 2R U raises w/U str   3R walks, raises w/U str  3R U sq w/domenica  3R plie    2R1G walks, raises w/U str  2R1G U sq w/domenica  3R plie 2R standing leg press  2R1B U calf raise w/str    2R1G walks, raises w/U str  2R1G U sq w/domenica 40/40  2R1G plie   Pelvic Stabilization   1R^1B //30 1R1B stand ab/ad 1R^1B #5 /30 1R1B #5 /30 1R1B #4 1R1B                Box: HSC    2R 1 arm stand leg press 1R1B 1 arm stand leg [id number] 1 arm stand leg press            Trunk Stabilization 1RTrA variat:

## 2019-09-11 ENCOUNTER — TREATMENT (OUTPATIENT)
Dept: PHYSICAL THERAPY | Age: 49
End: 2019-09-11

## 2019-09-11 PROCEDURE — MISCPILATES PILATES CLASS

## 2019-09-11 NOTE — FLOWSHEET NOTE
[address]  Phone: [phone]  Fax [phone]      Date:  2019    Patient Name:  Julee Hays    :  1970  MRN: [id number]  Restrictions/Precautions:    Medical/Treatment Diagnosis Information:  ·  Hist LBP, froz shldr     Physician Information:       Patient is Post-Op [] Yes   [] No     DOS:           19       Subjective My L knee is bothering me a little Feeling real good today No complaints doing really well             25 min late    Weeks Post-Op             1/7     2/7 3/7 4/7 5/7 6/7 7/7   Objective Still working on trunk stab  General strength improvements  Difficulty with scap stab   MMT hamstrings L /   - [place] Improvements noted in trunk stab and hip disassociation    Improvements noted in trunk stab Still working on trunk stab, good ham flexibility Upper trap tightness, scap elevates with shldr flex Trunk stab improvements noted Still working on trunk stab, good ham flexibility,              Goals Pilates conditioning for spine stab and maintenance Pilates conditioning for spine stab and maintenance Pilates conditioning for spine stab and maintenance Pilates conditioning for spine stab and maintenance Pilates conditioning for spine stab and scap stab maintenance Pilates conditioning for spine stab, scap stab, and maintenance Pilates conditioning for spine stab and maintenance             Reformer Exercises 2R standing leg press  2R1B U calf raise w/str 2R U raises w/U str   3R walks, raises w/U str  3R U sq w/domenica  3R plie    2R1G walks, raises w/U str  2R1G U sq w/domenica  3R plie 2R standing leg press  2R1B U calf raise w/str    2R1G walks, raises w/U str  2R1G U sq w/domenica 40/40  2R1G plie   Pelvic Stabilization   1R^1B  1R1B stand ab/ad 1R^1B #5  1R1B #5  1R1B #4 1R1B                Box: HSC    2R 1 arm stand leg press 1R1B 1 arm stand leg [id number] 1 arm stand leg press Summary/Comments                                           Electronically signed by:  Claudio Kennedy, ATC

## 2019-09-18 ENCOUNTER — TREATMENT (OUTPATIENT)
Dept: PHYSICAL THERAPY | Age: 49
End: 2019-09-18

## 2019-09-18 PROCEDURE — MISCPILATES PILATES CLASS

## 2019-09-18 NOTE — FLOWSHEET NOTE
[address]  Phone: [phone]  Fax [phone]      Date:  2019    Patient Name:  Clif Beckett    :  1970  MRN: [id number]  Restrictions/Precautions:    Medical/Treatment Diagnosis Information:  ·  Hist LBP, froz shldr     Physician Information:       Patient is Post-Op [] Yes   [] No     DOS:           19      Subjective My L knee is bothering me a little Feeling real good today No complaints doing really well Feeling good            25 min late    Weeks Post-Op             1/7     2/7 3/7 4/7 5/7 6/7 7/7   Objective Still working on trunk stab  General strength improvements  Difficulty with scap stab   MMT hamstrings L 4/   - [place] Improvements noted in trunk stab and hip disassociation    Improvements noted in trunk stab Still working on trunk stab, good ham flexibility Upper trap tightness, scap elevates with shldr flex Trunk stab improvements noted Still working on trunk stab, good ham flexibility,              Goals Pilates conditioning for spine stab and maintenance Pilates conditioning for spine stab and maintenance Pilates conditioning for spine stab and maintenance Pilates conditioning for spine stab and maintenance Pilates conditioning for spine stab and scap stab maintenance Pilates conditioning for spine stab, scap stab, and maintenance Pilates conditioning for spine stab and maintenance             Reformer Exercises 2R standing leg press  2R1B U calf raise w/str 2R U raises w/U str   3R walks, raises w/U str  3R U sq w/domenica  3R plie    2R1G walks, raises w/U str  2R1G U sq w/domenica  3R plie 2R standing leg press  2R1B U calf raise w/str    2R1G walks, raises w/U str  2R1G U sq w/domenica 40/40  2R1G plie   Pelvic Stabilization   1R^1B 30 1R1B stand ab/ad 1R^1B #5 30 1R1B #5  1R1B #4 1R1B                Box: HSC    2R 1 arm stand leg press 1R1B 1 arm stand leg [id number] 1 arm

## 2019-09-25 ENCOUNTER — TREATMENT (OUTPATIENT)
Dept: PHYSICAL THERAPY | Age: 49
End: 2019-09-25

## 2019-09-25 PROCEDURE — MISCPILATES PILATES CLASS

## 2019-09-25 NOTE — FLOWSHEET NOTE
monster walks, wall sq,  standing quad str OH stretch on wall [place] L upper trap                                  Summary/Comments                                           Electronically signed by:  Lucero Souza ATC

## 2019-10-02 ENCOUNTER — TREATMENT (OUTPATIENT)
Dept: PHYSICAL THERAPY | Age: 49
End: 2019-10-02

## 2019-10-02 PROCEDURE — MISCPILATES PILATES CLASS

## 2019-10-09 ENCOUNTER — TREATMENT (OUTPATIENT)
Dept: PHYSICAL THERAPY | Age: 49
End: 2019-10-09

## 2019-10-24 ENCOUNTER — HOSPITAL ENCOUNTER (OUTPATIENT)
Dept: PHYSICAL THERAPY | Age: 49
Discharge: HOME | End: 2019-10-24

## 2019-10-24 PROCEDURE — 9990000010 HC NO CHARGE VISIT: Performed by: SPECIALIST/TECHNOLOGIST

## 2019-10-24 PROCEDURE — 9990000029 HC GAP PACKAGE: Performed by: SPECIALIST/TECHNOLOGIST

## 2019-10-24 NOTE — FLOWSHEET NOTE
[address]  Phone: [phone]  Fax [phone]      Date:  10/24/2019    Patient Name:  Ricky Castle    :  1970  MRN: [id number]  Restrictions/Precautions:    Medical/Treatment Diagnosis Information:  ·  Hist LBP, froz shldr     Physician Information:       Patient is Post-Op [] Yes   [] No     DOS:           10/24/19         Subjective Feelling good                   Weeks Post-Op             1/7     2/7 3/7 4/7 5/7 6/7 7/7   Objective Still working on trunk stab  General strength improvements  Difficulty with scap stab   MMT hamstrings L    - [place] Improvements noted in trunk stab and hip disassociation    Improvements noted in trunk stab Still working on trunk stab, good ham flexibility Upper trap tightness, scap elevates with shldr flex Trunk stab improvements noted Still working on trunk stab, good ham flexibility,              Goals Pilates conditioning for spine stab and maintenance Pilates conditioning for spine stab and maintenance Pilates conditioning for spine stab and maintenance Pilates conditioning for spine stab and maintenance Pilates conditioning for spine stab and scap stab maintenance Pilates conditioning for spine stab, scap stab, and maintenance Pilates conditioning for spine stab and maintenance             Reformer Exercises 2R standing leg press  2R1B U calf raise w/str 2R U raises w/U str   3R walks, raises w/U str  3R U sq w/domenica  3R plie    2R1G walks, raises w/U str  2R1G U sq w/domenica  3R plie 2R standing leg press  2R1B U calf raise w/str    2R1G walks, raises w/U str  2R1G U sq w/domenica 40/40  2R1G plie   Pelvic Stabilization   1R^1B /30 1R1B stand ab/ad 1R^1B #5 30 1R1B #5 30 1R1B #4 1R1B                Box: HSC    2R 1 arm stand leg press 1R1B 1 arm stand leg [id number] 1 arm stand leg press            Trunk Stabilization 1RTrA variat: 90/90, leg lifts, scissor, knee ext   2R 90/90, leg lifts, scissor  No trunk flexion 1R1B 90/90, leg lifts, scissor, knee ext  1R1B flex, obl    1R1B 90/90, leg lifts, scissor, knee ext  1R1B flex, obl 1G 90/90, leg lifts, scissor, knee ext  1R1B flex, obl  SCAP STAB 1R1B 90/90, leg lifts, scissor, knee ext  1R1B flex, [id number] 90/90, leg lifts, scissor, knee ext  1R1B flex, obl                                 Hip Disassociation 2R1B ll,v,[place],ir/er,frog 2R1B ll,v,[place],ir/er,frog, U w/abd 2R1B ll,v,[place],ir/er,frog 2R1B ll,v,[place],ir/er,frog 2R1B ll,v,[place],ir/er,frog, U w/abd 2R1B ll,v,[place],ir/er,frog, U w/abd 2R1B ll,v,[place],ir/er,frog, U w/abd               2R stand leg press 1R1B stand leg press 1 arm                  Scapular Stabilization 1R1B seated bi tri, low row, shldr ext 1R1B 1R1B #5 1R1B #5   1R1B 6#      Upper trap stretch Shoulder wall stretch, IR stretch with towel Seated:2R1B row, biceps          1R1B RC, ext               Thoracic Mobility  2R Bridge from heels  2R Bridge from toes 2R Bridge from heels w/ext  2R Bridge from toes w/ext 2R Bridge from heels w/ext  2R Bridge from toes w/ext 2R Bridge from heels w/ext  2R Bridge from toes w/ext  2R Bridge from heels w/ext  2R Bridge from toes w/ext      Seated:2R1B row, biceps   1R1B RC, ext                              General ROM Hams,hip add,pirif,lunge, gastroc, soleus Hams,hip add,pirif,lunge    Shoulder IR stretch w/strap Hams,hip add,pirif,lunge Hams,hip add,pirif,lunge   Standing quad stretch    Hams,hip add,pirif,lunge   Standing quad stretch    Hams,hip add,pirif,lunge   Standing quad stretch    Hams,hip add,pirif,lunge   Standing quad stretch      1R mermaid         Standing quad stretch  1R mermaid                         Other   HEP Bridge, monster walks, wall sq,  standing quad str HEP Bridge, monster walks, wall sq,  standing quad str OH stretch on wall Duyen BECKER upper trap                                  Summary/Comments                                           Electronically signed by:  Lavelle Solis, ATC

## 2019-10-31 ENCOUNTER — HOSPITAL ENCOUNTER (OUTPATIENT)
Dept: PHYSICAL THERAPY | Age: 49
Discharge: HOME | End: 2019-10-31

## 2019-10-31 PROCEDURE — 9990000010 HC NO CHARGE VISIT: Performed by: SPECIALIST/TECHNOLOGIST

## 2019-10-31 NOTE — FLOWSHEET NOTE
Stabilization 1RTrA variat: 90/90, leg lifts, scissor, knee ext   1R1B 90/90, leg lifts, scissor  1R1B flex, obl 1R1B 90/90, leg lifts, scissor, knee ext  1R1B flex, obl    1R1B 90/90, leg lifts, scissor, knee ext  1R1B flex, obl 1G 90/90, leg lifts, scissor, knee ext  1R1B flex, obl  SCAP STAB 1R1B 90/90, leg lifts, scissor, knee ext  1R1B flex, [id number] 90/90, leg lifts, scissor, knee ext  1R1B flex, obl                                 Hip Disassociation 2R1B ll,v,[place],ir/er,frog 2R1B ll,v,[place],ir/er,frog, U w/abd 2R1B ll,v,[place],ir/er,frog 2R1B ll,v,[place],ir/er,frog 2R1B ll,v,[place],ir/er,frog, U w/abd 2R1B ll,v,[place],ir/er,frog, U w/abd 2R1B ll,v,[place],ir/er,frog, U w/abd     1R1B U  arcs          2R stand leg press 1R1B stand leg press 1 arm                  Scapular Stabilization 1R1B seated bi tri, low row, shldr ext 1R1B 1R1B #5 1R1B #5   1R1B 6#      Upper trap stretch Shoulder wall stretch, IR stretch with towel Seated:2R1B row, biceps          1R1B RC, ext               Thoracic Mobility  2R Bridge from heels  2R Bridge from toes 2R Bridge from heels w/ext  2R Bridge from toes w/ext 2R Bridge from heels w/ext  2R Bridge from toes w/ext 2R Bridge from heels w/ext  2R Bridge from toes w/ext  2R Bridge from heels w/ext  2R Bridge from toes w/ext      Seated:2R1B row, biceps   1R1B RC, ext                              General ROM Hams,hip add,pirif,lunge, gastroc, soleus Hams,hip add,pirif,lunge     Hams,hip add,pirif,lunge Hams,hip add,pirif,lunge   Standing quad stretch    Hams,hip add,pirif,lunge   Standing quad stretch    Hams,hip add,pirif,lunge   Standing quad stretch    Hams,hip add,pirif,lunge   Standing quad stretch      1R mermaid         Standing quad stretch  1R mermaid                         Other   HEP Bridge, monster walks, wall sq,  standing quad str HEP Bridge, monster walks, wall sq,  standing quad str OH stretch on wall [facility] trap Summary/Comments                                           Electronically signed by:  Antwon Cain ATC

## 2019-11-07 ENCOUNTER — HOSPITAL ENCOUNTER (OUTPATIENT)
Dept: PHYSICAL THERAPY | Age: 49
Discharge: HOME | End: 2019-11-07

## 2019-11-07 PROCEDURE — 9990000010 HC NO CHARGE VISIT: Performed by: SPECIALIST/TECHNOLOGIST

## 2019-11-07 NOTE — FLOWSHEET NOTE
[address]  Phone: [phone]  Fax [phone]      Date:  2019    Patient Name:  Asiya Burnette    :  1970  MRN: [id number]  Restrictions/Precautions:    Medical/Treatment Diagnosis Information:  ·  Hist LBP, froz shldr     Physician Information:       Patient is Post-Op [] Yes   [] No     DOS:           10/24/19 10/31/19 11/7/19       Subjective Feelling good No complaints Feeling good today                 Weeks Post-Op             1/7     2/7 3/7 4/7 5/7 6/7 7/7   Objective Still working on trunk stab  General strength improvements  Difficulty with scap stab   MMT hamstrings L 4/5   - McMurrays Improvements noted in trunk stab and hip disassociation    Improvements noted in trunk stab Still working on trunk stab, good ham flexibility Upper trap tightness, scap elevates with shldr flex Trunk stab improvements noted Still working on trunk stab, good ham flexibility,              Goals Pilates conditioning for spine stab and maintenance Pilates conditioning for spine stab and maintenance Pilates conditioning for spine stab and maintenance Pilates conditioning for spine stab and maintenance Pilates conditioning for spine stab and scap stab maintenance Pilates conditioning for spine stab, scap stab, and maintenance Pilates conditioning for spine stab and maintenance             Reformer Exercises 2R standing leg press  2R1B U calf raise w/str 3R walks, raises w/U str  3R U sq w/domenica  3R plie 3R walks, raises w/U str  3R U sq w/domenica  3R plie    2R1G walks, raises w/U str  2R1G U sq w/domenica  3R plie 2R standing leg press  2R1B U calf raise w/str    2R1G walks, raises w/U str  2R1G U sq w/domenica 40/40  2R1G plie   Pelvic Stabilization   1R^1B  1R1B stand ab/ad #6 1R^1B #5  1R1B #5  1R1B #4 1R1B                Box: HSC Box: HSC, spine ext   2R 1 arm stand leg press 1R1B 1 arm stand leg [id number] 1 arm stand leg press Trunk Stabilization 1RTrA variat: 90/90, leg lifts, scissor, knee ext   1R1B 90/90, leg lifts, scissor  1R1B flex, obl 1R1B 90/90, leg lifts, scissor, knee ext  1R1B flex, obl    1R1B 90/90, leg lifts, scissor, knee ext  1R1B flex, obl 1G 90/90, leg lifts, scissor, knee ext  1R1B flex, obl  SCAP STAB 1R1B 90/90, leg lifts, scissor, knee ext  1R1B flex, [id number] 90/90, leg lifts, scissor, knee ext  1R1B flex, obl                                 Hip Disassociation 2R1B ll,v,[place],ir/er,frog 2R1B ll,v,[place],ir/er,frog, U w/abd 2R1B ll,v,[place],ir/er,frog 2R1B ll,v,[place],ir/er,frog 2R1B ll,v,[place],ir/er,frog, U w/abd 2R1B ll,v,[place],ir/er,frog, U w/abd 2R1B ll,v,[place],ir/er,frog, U w/abd     1R1B U  arcs          2R stand leg press 1R1B stand leg press 1 arm                  Scapular Stabilization 1R1B seated bi tri, low row, shldr ext 1R1B 1R1B #5 1R1B #5   1R1B 6#      Upper trap stretch Shoulder wall stretch, IR stretch with towel Seated:2R1B row, biceps          1R1B RC, ext               Thoracic Mobility  2R Bridge from heels  2R Bridge from toes 2R Bridge from heels w/ext  2R Bridge from toes w/ext 2R Bridge from heels w/ext  2R Bridge from toes w/ext 2R Bridge from heels w/ext  2R Bridge from toes w/ext  2R Bridge from heels w/ext  2R Bridge from toes w/ext      Seated:2R1B row, biceps   1R1B RC, ext                              General ROM Hams,hip add,pirif,lunge, gastroc, soleus Hams,hip add,pirif,lunge     Hams,hip add,pirif,lunge Hams,hip add,pirif,lunge   Standing quad stretch    Hams,hip add,pirif,lunge   Standing quad stretch    Hams,hip add,pirif,lunge   Standing quad stretch    Hams,hip add,pirif,lunge   Standing quad stretch      1R mermaid         Standing quad stretch  1R mermaid                         Other   HEP Bridge, monster walks, wall sq,  standing quad str HEP Bridge, monster walks, wall sq,  standing quad str OH stretch on wall [facility] L upper trap Summary/Comments                                           Electronically signed by:  Fabián Ventura, ATC

## 2019-11-12 ENCOUNTER — HOSPITAL ENCOUNTER (OUTPATIENT)
Dept: PHYSICAL THERAPY | Age: 49
Discharge: HOME | End: 2019-11-12

## 2019-11-12 PROCEDURE — 9990000010 HC NO CHARGE VISIT: Performed by: SPECIALIST/TECHNOLOGIST

## 2019-11-12 NOTE — FLOWSHEET NOTE
[address]  Phone: [phone]  Fax [phone]      Date:  2019    Patient Name:  Elena Arriaga    :  1970  MRN: [id number]  Restrictions/Precautions:    Medical/Treatment Diagnosis Information:  ·  Hist LBP, froz shldr     Physician Information:       Patient is Post-Op [] Yes   [] No     DOS:           10/24/19 10/31/19 11/7/19 11/12/19      Subjective Feelling good No complaints Feeling good today No changes                 Weeks Post-Op             1/7     2/7 3/7 4/7 5/7 6/7 7/7   Objective Still working on trunk stab  General strength improvements  Difficulty with scap stab   MMT hamstrings L 4/   - McMurrays Improvements noted in trunk stab and hip disassociation    Improvements noted in trunk stab Still working on trunk stab, good ham flexibility Upper trap tightness, scap elevates with shldr flex Trunk stab improvements noted Still working on trunk stab, good ham flexibility,              Goals Pilates conditioning for spine stab and maintenance Pilates conditioning for spine stab and maintenance Pilates conditioning for spine stab and maintenance Pilates conditioning for spine stab and maintenance Pilates conditioning for spine stab and scap stab maintenance Pilates conditioning for spine stab, scap stab, and maintenance Pilates conditioning for spine stab and maintenance             Reformer Exercises 2R standing leg press  2R1B U calf raise w/str 3R walks, raises w/U str  3R U sq w/domenica  3R plie 3R walks, raises w/U str  3R U sq w/domenica  3R plie    2R1G walks, raises w/U str  2R1G U sq w/domenica  3R plie 2R standing leg press  2R1B U calf raise w/str    2R1G walks, raises w/U str  2R1G U sq w/domenica 40/40  2R1G plie   Pelvic Stabilization   1R^1B  1R1B stand ab/ad #6 1R^1B #5  1R1B #5  1R1B #4 1R1B                Box: HSC Box: HSC, spine ext   2R 1 arm stand leg press 1R1B 1 arm stand leg [id number] 1 arm Summary/Comments                                           Electronically signed by:  Jt Neff ATC

## 2019-11-21 ENCOUNTER — HOSPITAL ENCOUNTER (OUTPATIENT)
Dept: PHYSICAL THERAPY | Age: 49
Discharge: HOME | End: 2019-11-21

## 2019-11-21 PROCEDURE — 9990000010 HC NO CHARGE VISIT: Performed by: SPECIALIST/TECHNOLOGIST

## 2019-11-21 NOTE — FLOWSHEET NOTE
[address], [address] [place] [address], [address] [place] [address]  Phone: [phone]  Fax [phone]      Date:  2019    Patient Name:  Pily Rico    :  1970  MRN: [id number]  Restrictions/Precautions:    Medical/Treatment Diagnosis Information:  ·  Hist LBP, froz shldr     Physician Information:       Patient is Post-Op [] Yes   [] No     DOS:           10/24/19 10/31/19 11/7/19 11/12/19 11/21/19     Subjective Feelling good No complaints Feeling good today No changes  Feeling good today               Weeks Post-Op             1/7     2/7 3/7 4/7 5/7 6/7 7/7   Objective Still working on trunk stab  General strength improvements  Difficulty with scap stab   MMT hamstrings L 4/5   - McMurrays Improvements noted in trunk stab and hip disassociation    Improvements noted in trunk stab Still working on trunk stab, good ham flexibility Upper trap tightness, scap elevates with shldr flex Trunk stab improvements noted Still working on trunk stab, good ham flexibility,              Goals Pilates conditioning for spine stab and maintenance Pilates conditioning for spine stab and maintenance Pilates conditioning for spine stab and maintenance Pilates conditioning for spine stab and maintenance Pilates conditioning for spine stab and scap stab maintenance Pilates conditioning for spine stab, scap stab, and maintenance Pilates conditioning for spine stab and maintenance             Reformer Exercises 2R standing leg press  2R1B U calf raise w/str 3R walks, raises w/U str  3R U sq w/domenica  3R plie 3R walks, raises w/U str  3R U sq w/domenica  3R plie    2R1G walks, raises w/U str  2R1G U sq w/domenica  3R plie 2R standing leg press  2R1B U calf raise w/str    2R1G walks, raises w/U str  2R1G U sq w/domenica 40/40  2R1G plie   Pelvic Stabilization   1R^1B 30 1R1B stand ab/ad #6 1R^1B #5  1R1B #5 30 1R1B #4 1R1B                Box: HSC Box: HSC, spine ext   2R 1 arm stand leg press 1R1B 1 Duyen toledo [facility]                                  Summary/Comments                                           Electronically signed by:  Lavelle Solis ATC

## 2019-11-26 ENCOUNTER — HOSPITAL ENCOUNTER (OUTPATIENT)
Dept: PHYSICAL THERAPY | Age: 49
Discharge: HOME | End: 2019-11-26

## 2019-11-26 PROCEDURE — 9990000010 HC NO CHARGE VISIT: Performed by: SPECIALIST/TECHNOLOGIST

## 2019-11-26 NOTE — FLOWSHEET NOTE
[address]  Phone: [phone]  Fax [phone]      Date:  2019    Patient Name:  Todd Cam    :  1970  MRN: [id number]  Restrictions/Precautions:    Medical/Treatment Diagnosis Information:  ·  Hist LBP, froz shldr     Physician Information:       Patient is Post-Op [] Yes   [] No     DOS:           10/24/19 10/31/19 11/7/19 11/12/19 11/21/19 11/26/19    Subjective Feelling good No complaints Feeling good today No changes  Feeling good today NO complaints today              Weeks Post-Op             1/7     2/7 3/7 4/7 5/7 6/7 7/7   Objective Still working on trunk stab  General strength improvements  Difficulty with scap stab   MMT hamstrings L 4/   - McMurrays Improvements noted in trunk stab and hip disassociation    Improvements noted in trunk stab Still working on trunk stab, good ham flexibility Upper trap tightness, scap elevates with shldr flex Trunk stab improvements noted Still working on trunk stab, good ham flexibility,              Goals Pilates conditioning for spine stab and maintenance Pilates conditioning for spine stab and maintenance Pilates conditioning for spine stab and maintenance Pilates conditioning for spine stab and maintenance Pilates conditioning for spine stab and scap stab maintenance Pilates conditioning for spine stab, scap stab, and maintenance Pilates conditioning for spine stab and maintenance             Reformer Exercises 2R standing leg press  2R1B U calf raise w/str 3R walks, raises w/U str  3R U sq w/domenica  3R plie 3R walks, raises w/U str  3R U sq w/domenica  3R plie    2R1G walks, raises w/U str  2R1G U sq w/domenica  3R plie 2R standing leg press  2R1B U calf raise w/str    2R1G walks, raises w/U str  2R1G U sq w/domenica 40/40  2R1G plie   Pelvic Stabilization   1R^1B  1R1B stand ab/ad #6 1R^1B #5  1R1B #5  1R1B #4 1R1B                Box: HSC Box: HSC, spine ext   2R 1 arm stand leg press 1R1B 1 arm stand leg [id number] 1 arm stand leg press            Trunk Stabilization 1RTrA variat: 90/90, leg lifts, scissor, knee ext   1R1B 90/90, leg lifts, scissor  1R1B flex, obl 1R1B 90/90, leg lifts, scissor, knee ext  1R1B flex, obl    1R1B 90/90, leg lifts, scissor, knee ext  1R1B flex, obl 1G 90/90, leg lifts, scissor, knee ext  1R1B flex, obl  SCAP STAB 1R1B 90/90, leg lifts, scissor, knee ext  1R1B flex, [id number] 90/90, leg lifts, scissor, knee ext  1R1B flex, obl                                 Hip Disassociation 2R1B ll,v,[place],ir/er,frog 2R1B ll,v,[place],ir/er,frog, U w/abd 2R1B ll,v,[place],ir/er,frog 2R1B ll,v,[place],ir/er,frog 2R1B ll,v,[place],ir/er,frog, U w/abd 2R1B ll,v,[place],ir/er,frog, U w/abd 2R1B ll,v,[place],ir/er,frog, U w/abd     1R1B U  arcs          2R stand leg press 1R1B stand leg press 1 arm                  Scapular Stabilization 1R1B seated bi tri, low row, shldr ext 1R1B 1R1B #5 1R1B #5   1R1B 6#      Upper trap stretch Shoulder wall stretch, IR stretch with towel Seated:2R1B row, biceps          1R1B RC, ext               Thoracic Mobility  2R Bridge from heels  2R Bridge from toes 2R Bridge from heels w/ext  2R Bridge from toes w/ext 2R Bridge from heels w/ext  2R Bridge from toes w/ext 2R Bridge from heels w/ext  2R Bridge from toes w/ext  2R Bridge from heels w/ext  2R Bridge from toes w/ext      Seated:2R1B row, biceps   1R1B RC, ext                              General ROM Hams,hip add,pirif,lunge, gastroc, soleus Hams,hip add,pirif,lunge     Hams,hip add,pirif,lunge Hams,hip add,pirif,lunge   Standing quad stretch    Hams,hip add,pirif,lunge   Standing quad stretch    Hams,hip add,pirif,lunge   Standing quad stretch    Hams,hip add,pirif,lunge   Standing quad stretch      1R mermaid         Standing quad stretch  1R mermaid                         Other   HEP Bridge, monster walks, wall sq,  standing quad str HEP Bridge, monster walks, wall sq,  standing quad str OH stretch on wall Duyen BECKER upper trap                                  Summary/Comments                                           Electronically signed by:  Lavelle Solis ATC

## 2019-12-05 ENCOUNTER — HOSPITAL ENCOUNTER (OUTPATIENT)
Dept: PHYSICAL THERAPY | Age: 49
Discharge: HOME | End: 2019-12-05

## 2019-12-05 PROCEDURE — 9990000010 HC NO CHARGE VISIT: Performed by: SPECIALIST/TECHNOLOGIST

## 2019-12-05 NOTE — FLOWSHEET NOTE
20/20/30 1R1B #5 20/20/30 1R1B #4 1R1B                Box: HSC Box: HSC, spine ext   2R 1 arm stand leg press 1R1B 1 arm stand leg [id number] 1 arm stand leg press            Trunk Stabilization 1RTrA variat: 90/90, leg lifts, scissor, knee ext   1R1B 90/90, leg lifts, scissor  1R1B flex, obl 1R1B 90/90, leg lifts, scissor, knee ext  1R1B flex, obl    1R1B 90/90, leg lifts, scissor, knee ext  1R1B flex, obl 1G 90/90, leg lifts, scissor, knee ext  1R1B flex, obl  SCAP STAB 1R1B 90/90, leg lifts, scissor, knee ext  1R1B flex, [id number] 90/90, leg lifts, scissor, knee ext  1R1B flex, obl                                 Hip Disassociation 2R1B ll,v,[place],ir/er,frog 2R1B ll,v,[place],ir/er,frog, U w/abd 2R1B ll,v,[place],ir/er,frog 2R1B ll,v,[place],ir/er,frog 2R1B ll,v,[place],ir/er,frog, U w/abd 2R1B ll,v,[place],ir/er,frog, U w/abd 2R1B ll,v,[place],ir/er,frog, U w/abd     1R1B U  arcs          2R stand leg press 1R1B stand leg press 1 arm                  Scapular Stabilization 1R1B seated bi tri, low row, shldr ext 1R1B 1R1B #5 1R1B #5   1R1B 6#      Upper trap stretch Shoulder wall stretch, IR stretch with towel Seated:2R1B row, biceps          1R1B RC, ext               Thoracic Mobility  2R Bridge from heels  2R Bridge from toes 2R Bridge from heels w/ext  2R Bridge from toes w/ext 2R Bridge from heels w/ext  2R Bridge from toes w/ext 2R Bridge from heels w/ext  2R Bridge from toes w/ext  2R Bridge from heels w/ext  2R Bridge from toes w/ext      Seated:2R1B row, biceps   1R1B RC, ext                              General ROM Hams,hip add,pirif,lunge, gastroc, soleus Hams,hip add,pirif,lunge     Hams,hip add,pirif,lunge Hams,hip add,pirif,lunge   Standing quad stretch    Hams,hip add,pirif,lunge   Standing quad stretch    Hams,hip add,pirif,lunge   Standing quad stretch    Hams,hip add,pirif,lunge   Standing quad stretch      1R mermaid         Standing quad stretch  1R mermaid                         Other   HEP Bridge,

## 2019-12-10 ENCOUNTER — HOSPITAL ENCOUNTER (OUTPATIENT)
Dept: PHYSICAL THERAPY | Age: 49
Discharge: HOME | End: 2019-12-10

## 2019-12-10 PROCEDURE — 9990000029 HC GAP PACKAGE: Performed by: SPECIALIST/TECHNOLOGIST

## 2019-12-10 PROCEDURE — 9990000010 HC NO CHARGE VISIT: Performed by: SPECIALIST/TECHNOLOGIST

## 2019-12-10 NOTE — FLOWSHEET NOTE
[address]  Phone: [phone]  Fax [phone]      Date:  12/10/2019    Patient Name:  Osmar Mack    :  1970  MRN: [id number]  Restrictions/Precautions:    Medical/Treatment Diagnosis Information:  ·  Hist LBP, froz shldr     Physician Information:       Patient is Post-Op [] Yes   [] No     DOS:           12/10/19         Subjective My back is a little sore from working with 8lb [employer] ball                   Weeks Post-Op pd 210 12/10/19            1/7  Gap package  No Chg visit     2/7 3/7 4/7 5/7 6/7 7/7   Objective Still working on trunk stab  General strength improvements  Difficulty with scap stab   MMT hamstrings L /   - McMurrays Improvements noted in trunk stab and hip disassociation    Improvements noted in trunk stab Still working on trunk stab, good ham flexibility Upper trap tightness, scap elevates with shldr flex Trunk stab improvements noted Still working on trunk stab, good ham flexibility,              Goals Pilates conditioning for spine stab and maintenance Pilates conditioning for spine stab and maintenance Pilates conditioning for spine stab and maintenance Pilates conditioning for spine stab and maintenance Pilates conditioning for spine stab and scap stab maintenance Pilates conditioning for spine stab, scap stab, and maintenance Pilates conditioning for spine stab and maintenance             Reformer Exercises 2R standing leg press  2R1B U calf raise w/str 3R walks, raises w/U str  3R U sq w/domenica  3R plie 3R walks, raises w/U str  3R U sq w/domenica  3R plie    2R1G walks, raises w/U str  2R1G U sq w/domenica  3R plie 2R standing leg press  2R1B U calf raise w/str    2R1G walks, raises w/U str  2R1G U sq w/domenica 40/40  2R1G plie   Pelvic Stabilization   1R^1B 30 1R1B stand ab/ad #6 1R^1B #5 30 1R1B #5 30 1R1B #4 1R1B                Box: HSC Box: HSC, spine ext   2R 1 arm stand leg press 1R1B 1 arm stand leg [id number] 1 arm stand leg press            Trunk Stabilization 1RTrA variat: 90/90, leg lifts, scissor, knee ext   1R1B 90/90, leg lifts, scissor  1R1B flex, obl 1R1B 90/90, leg lifts, scissor, knee ext  1R1B flex, obl    1R1B 90/90, leg lifts, scissor, knee ext  1R1B flex, obl 1G 90/90, leg lifts, scissor, knee ext  1R1B flex, obl  SCAP STAB 1R1B 90/90, leg lifts, scissor, knee ext  1R1B flex, [id number] 90/90, leg lifts, scissor, knee ext  1R1B flex, obl                                 Hip Disassociation 2R1B ll,v,[place],ir/er,frog 2R1B ll,v,[place],ir/er,frog, U w/abd 2R1B ll,v,[place],ir/er,frog 2R1B ll,v,[place],ir/er,frog 2R1B ll,v,[place],ir/er,frog, U w/abd 2R1B ll,v,[place],ir/er,frog, U w/abd 2R1B ll,v,[place],ir/er,frog, U w/abd     1R1B U  arcs          2R stand leg press 1R1B stand leg press 1 arm                  Scapular Stabilization 1R1B seated bi tri, low row, shldr ext 1R1B 1R1B #5 1R1B #5   1R1B 6#      Upper trap stretch Shoulder wall stretch, IR stretch with towel Seated:2R1B row, biceps          1R1B RC, ext               Thoracic Mobility  2R Bridge from heels  2R Bridge from toes 2R Bridge from heels w/ext  2R Bridge from toes w/ext 2R Bridge from heels w/ext  2R Bridge from toes w/ext 2R Bridge from heels w/ext  2R Bridge from toes w/ext  2R Bridge from heels w/ext  2R Bridge from toes w/ext      Seated:2R1B row, biceps   1R1B RC, ext                              General ROM Hams,hip add,pirif,lunge, gastroc, soleus Hams,hip add,pirif,lunge     Hams,hip add,pirif,lunge Hams,hip add,pirif,lunge   Standing quad stretch    Hams,hip add,pirif,lunge   Standing quad stretch    Hams,hip add,pirif,lunge   Standing quad stretch    Hams,hip add,pirif,lunge   Standing quad stretch      1R mermaid         Standing quad stretch  1R mermaid                         Other   HEP Bridge, monster walks, wall sq,  standing quad str HEP Bridge, monster walks, wall sq,  standing quad str OH stretch on wall Duyen toledo [facility]                                  Summary/Comments                                           Electronically signed by:  Tanya Salinas, ATC

## 2019-12-17 ENCOUNTER — HOSPITAL ENCOUNTER (OUTPATIENT)
Dept: PHYSICAL THERAPY | Age: 49
Discharge: HOME | End: 2019-12-17

## 2019-12-17 PROCEDURE — 9990000010 HC NO CHARGE VISIT: Performed by: SPECIALIST/TECHNOLOGIST

## 2019-12-17 NOTE — FLOWSHEET NOTE
arm stand leg press 1R1B 1 arm stand leg [id number] 1 arm stand leg press            Trunk Stabilization 1RTrA variat: 90/90, leg lifts, scissor, knee ext   1R1B 90/90, leg lifts, scissor  1R1B flex, obl 1R1B 90/90, leg lifts, scissor, knee ext  1R1B flex, obl    1R1B 90/90, leg lifts, scissor, knee ext  1R1B flex, obl 1G 90/90, leg lifts, scissor, knee ext  1R1B flex, obl  SCAP STAB 1R1B 90/90, leg lifts, scissor, knee ext  1R1B flex, [id number] 90/90, leg lifts, scissor, knee ext  1R1B flex, obl                                 Hip Disassociation 2R1B ll,v,[place],ir/er,frog 2R1B ll,v,[place],ir/er,frog, U w/abd 2R1B ll,v,[place],ir/er,frog 2R1B ll,v,[place],ir/er,frog 2R1B ll,v,[place],ir/er,frog, U w/abd 2R1B ll,v,[place],ir/er,frog, U w/abd 2R1B ll,v,[place],ir/er,frog, U w/abd     1R1B U  arcs          2R stand leg press 1R1B stand leg press 1 arm                  Scapular Stabilization 1R1B seated bi tri, low row, shldr ext 1R1B 1R1B #5 1R1B #5   1R1B 6#      Upper trap stretch Shoulder wall stretch, IR stretch with towel Seated:2R1B row, biceps          1R1B RC, ext               Thoracic Mobility  2R Bridge from heels  2R Bridge from toes 2R Bridge from heels w/ext  2R Bridge from toes w/ext 2R Bridge from heels w/ext  2R Bridge from toes w/ext 2R Bridge from heels w/ext  2R Bridge from toes w/ext  2R Bridge from heels w/ext  2R Bridge from toes w/ext      Seated:2R1B row, biceps   1R1B RC, ext                              General ROM Hams,hip add,pirif,lunge, gastroc, soleus Hams,hip add,pirif,lunge     Hams,hip add,pirif,lunge Hams,hip add,pirif,lunge   Standing quad stretch    Hams,hip add,pirif,lunge   Standing quad stretch    Hams,hip add,pirif,lunge   Standing quad stretch    Hams,hip add,pirif,lunge   Standing quad stretch      1R mermaid         Standing quad stretch  1R mermaid                         Other   HEP Bridge, monster walks, wall sq,  standing quad str HEP Bridge, monster walks, wall sq,  standing quad str OH stretch on wall [place] L upper trap                                  Summary/Comments                                           Electronically signed by:  Abner Peña, ATC

## 2019-12-31 ENCOUNTER — HOSPITAL ENCOUNTER (OUTPATIENT)
Dept: PHYSICAL THERAPY | Age: 49
Discharge: HOME | End: 2019-12-31

## 2019-12-31 PROCEDURE — 9990000010 HC NO CHARGE VISIT: Performed by: SPECIALIST/TECHNOLOGIST

## 2019-12-31 NOTE — FLOWSHEET NOTE
[address], [address] [place] [address]  Phone: [phone]  Fax [phone]      Date:  2019    Patient Name:  Kimberley Burris    :  1970  MRN: [id number]  Restrictions/Precautions:    Medical/Treatment Diagnosis Information:  ·  Hist LBP, froz shldr     Physician Information:       Patient is Post-Op [] Yes   [] No     DOS:           12/10/19 12/17/19 12/31/19       Subjective My back is a little sore from working with 8lb [employer] ball Been feeling pretty good Feeling good                 Weeks Post-Op pd 210 12/10/19            1/7  Gap package  No Chg visit     2/7 3/7 4/7 5/7 6/7 7/7   Objective Still working on trunk stab  General strength improvements  Difficulty with scap stab   MMT hamstrings L 4/5   - McMurrays Improvements noted in trunk stab and hip disassociation    Improvements noted in trunk stab Still working on trunk stab, good ham flexibility Upper trap tightness, scap elevates with shldr flex Trunk stab improvements noted Still working on trunk stab, good ham flexibility,              Goals Pilates conditioning for spine stab and maintenance Pilates conditioning for spine stab and maintenance Pilates conditioning for spine stab and maintenance Pilates conditioning for spine stab and maintenance Pilates conditioning for spine stab and scap stab maintenance Pilates conditioning for spine stab, scap stab, and maintenance Pilates conditioning for spine stab and maintenance             Reformer Exercises 2R standing leg press  2R1B U calf raise w/str 3R walks, raises w/U str  3R U sq w/domenica  3R plie 3R walks, raises w/U str  3R U sq w/domenica  3R plie    2R1G walks, raises w/U str  2R1G U sq w/domenica  3R plie 2R standing leg press  2R1B U calf raise w/str    2R1G walks, raises w/U str  2R1G U sq w/domenica 40/40  2R1G plie   Pelvic Stabilization   1R^1B  1R1B stand ab/ad #6 1R^1B #5  1R1B #5  1R1B #4 1R1B                Box: [address] Box: wall sq,  standing quad str OH stretch on wall [place] L upper trap                                  Summary/Comments                                           Electronically signed by:  Mark Joyner, ATC

## 2020-01-07 ENCOUNTER — HOSPITAL ENCOUNTER (OUTPATIENT)
Dept: PHYSICAL THERAPY | Age: 50
Discharge: HOME | End: 2020-01-07

## 2020-01-07 PROCEDURE — 9990000010 HC NO CHARGE VISIT: Performed by: SPECIALIST/TECHNOLOGIST

## 2020-01-07 NOTE — FLOWSHEET NOTE
[address]  Phone: [phone]  Fax [phone]      Date:  2020    Patient Name:  Lucia Street    :  1970  MRN: [id number]  Restrictions/Precautions:    Medical/Treatment Diagnosis Information:  ·  Hist LBP, froz shldr     Physician Information:       Patient is Post-Op [] Yes   [] No     DOS:           12/10/19 12/17/19 12/31/19 2020      Subjective My back is a little sore from working with 8lb [employer] ball Been feeling pretty good Feeling good Feeling real good today                Weeks Post-Op pd 210 12/10/19            1/7  Gap package  No Chg visit     2/7 3/7 4/7 5/7 6/7 7/7   Objective Still working on trunk stab  General strength improvements  Difficulty with scap stab   MMT hamstrings L 4/5   - [place]rays Improvements noted in trunk stab and hip disassociation    Improvements noted in trunk stab Still working on trunk stab, good ham flexibility Upper trap tightness, scap elevates with shldr flex Trunk stab improvements noted Still working on trunk stab, good ham flexibility,              Goals Pilates conditioning for spine stab and maintenance Pilates conditioning for spine stab and maintenance Pilates conditioning for spine stab and maintenance Pilates conditioning for spine stab and maintenance Pilates conditioning for spine stab and scap stab maintenance Pilates conditioning for spine stab, scap stab, and maintenance Pilates conditioning for spine stab and maintenance             Reformer Exercises 2R standing leg press  2R1B U calf raise w/str 3R walks, raises w/U str  3R U sq w/domenica  3R plie 3R walks, raises w/U str  3R U sq w/domenica  3R plie    2R1G walks, raises w/U str  2R1G U sq w/domenica  3R plie 2R standing leg press  2R1B U calf raise w/str    2R1G walks, raises w/U str  2R1G U sq w/domenica 40/40  2R1G plie   Pelvic Stabilization   1R^1B  1R1B stand ab/ad #6 1R^1B #5  1R1B #5  1R1B #4 1R1B Box: HSC Box: HSC, spine ext   2R 1 arm stand leg press 1R1B 1 arm stand leg [id number] 1 arm stand leg press            Trunk Stabilization 1RTrA variat: 90/90, leg lifts, scissor, knee ext   1R1B 90/90, leg lifts, scissor  1R1B flex, obl 1R1B 90/90, leg lifts, scissor, knee ext  1R1B flex, obl    1R1B 90/90, leg lifts, scissor, knee ext  1R1B flex, obl 1G 90/90, leg lifts, scissor, knee ext  1R1B flex, obl  SCAP STAB 1R1B 90/90, leg lifts, scissor, knee ext  1R1B flex, [id number] 90/90, leg lifts, scissor, knee ext  1R1B flex, obl                                 Hip Disassociation 2R1B ll,v,[place],ir/er,frog 2R1B ll,v,[place],ir/er,frog, U w/abd 2R1B ll,v,[place],ir/er,frog 2R1B ll,v,[place],ir/er,frog 2R1B ll,v,[place],ir/er,frog, U w/abd 2R1B ll,v,[place],ir/er,frog, U w/abd 2R1B ll,v,[place],ir/er,frog, U w/abd     1R1B U  arcs          2R stand leg press 1R1B stand leg press 1 arm                  Scapular Stabilization 1R1B seated bi tri, low row, shldr ext 1R1B 1R1B #5 1R1B #6   1R1B 6#      Upper trap stretch Shoulder wall stretch, IR stretch with towel Seated:2R1B row, biceps          1R1B RC, ext               Thoracic Mobility  2R Bridge from heels  2R Bridge from toes 2R Bridge from heels w/ext  2R Bridge from toes w/ext 2R Bridge from heels w/ext  2R Bridge from toes w/ext 2R Bridge from heels w/ext  2R Bridge from toes w/ext  2R Bridge from heels w/ext  2R Bridge from toes w/ext      Seated:2R1B row, biceps   1R1B RC, ext                              General ROM Hams,hip add,pirif,lunge, gastroc, soleus Hams,hip add,pirif,lunge     Hams,hip add,pirif,lunge Hams,hip add,pirif,lunge   Standing quad stretch    Hams,hip add,pirif,lunge   Standing quad stretch    Hams,hip add,pirif,lunge   Standing quad stretch    Hams,hip add,pirif,lunge   Standing quad stretch      1R mermaid         Standing quad stretch  1R mermaid                         Other   HEP Bridge, monster walks, wall sq,  standing quad str

## 2020-01-14 ENCOUNTER — HOSPITAL ENCOUNTER (OUTPATIENT)
Dept: PHYSICAL THERAPY | Age: 50
Discharge: HOME | End: 2020-01-14

## 2020-01-14 NOTE — FLOWSHEET NOTE
[address]  Phone: [phone]  Fax [phone]      Date:  2020    Patient Name:  Lam Penn    :  1970  MRN: [id number]  Restrictions/Precautions:    Medical/Treatment Diagnosis Information:  ·  Hist LBP, froz shldr     Physician Information:       Patient is Post-Op [] Yes   [] No     DOS:           12/10/19 12/17/19 12/31/19 2020 2020     Subjective My back is a little sore from working with 8lb [employer] ball Been feeling pretty good Feeling good Feeling real good today No changes               Weeks Post-Op pd 210 12/10/19            1/7  Gap package  No Chg visit     2/7 3/7 4/7 5/7 6/7 7/7   Objective Still working on trunk stab  General strength improvements  Difficulty with scap stab   MMT hamstrings L 4/5   - [place]rays Improvements noted in trunk stab and hip disassociation    Improvements noted in trunk stab Still working on trunk stab, good ham flexibility Upper trap tightness, scap elevates with shldr flex Trunk stab improvements noted Still working on trunk stab, good ham flexibility,              Goals Pilates conditioning for spine stab and maintenance Pilates conditioning for spine stab and maintenance Pilates conditioning for spine stab and maintenance Pilates conditioning for spine stab and maintenance Pilates conditioning for spine stab and scap stab maintenance Pilates conditioning for spine stab, scap stab, and maintenance Pilates conditioning for spine stab and maintenance             Reformer Exercises 2R standing leg press  2R1B U calf raise w/str 3R walks, raises w/U str  3R U sq w/domenica  3R plie 3R walks, raises w/U str  3R U sq w/domenica  3R plie    2R1G walks, raises w/U str  2R1G U sq w/domenica  3R plie 2R standing leg press  2R1B U calf raise w/str    2R1G walks, raises w/U str  2R1G U sq w/domenica 40/40  2R1G plie   Pelvic Stabilization   1R^1B  1R1B stand ab/ad #6 1R^1B #5  1R1B #5 20/20/30 1R1B #5  1R1B                Box: HSC Box: HSC, spine ext   2R 1 arm stand leg press 1R1B 1 arm stand leg [id number] 1 arm stand leg press            Trunk Stabilization 1RTrA variat: 90/90, leg lifts, scissor, knee ext   1R1B 90/90, leg lifts, scissor  1R1B flex, obl 1R1B 90/90, leg lifts, scissor, knee ext  1R1B flex, obl    1R1B 90/90, leg lifts, scissor, knee ext  1R1B flex, obl 1R1B 90/90, leg lifts, scissor, knee ext  1R1B flex, obl  SCAP STAB 1R1B 90/90, leg lifts, scissor, knee ext  1R1B flex, [id number] 90/90, leg lifts, scissor, knee ext  1R1B flex, obl                                 Hip Disassociation 2R1B ll,v,[place],ir/er,frog 2R1B ll,v,[place],ir/er,frog, U w/abd 2R1B ll,v,[place],ir/er,frog 2R1B ll,v,[place],ir/er,frog 2R1B ll,v,[place],ir/er,frog, U w/abd 2R1B ll,v,[place],ir/er,frog, U w/abd 2R1B ll,v,[place],ir/er,frog, U w/abd     1R1B U  arcs          2R stand leg press 1R1B stand leg press 1 arm                  Scapular Stabilization 1R1B seated bi tri, low row, shldr ext 1R1B 1R1B #5 1R1B #6   1R1B 6#      Upper trap stretch Shoulder wall stretch, IR stretch with towel Seated:2R1B row, biceps          1R1B RC, ext               Thoracic Mobility  2R Bridge from heels  2R Bridge from toes 2R Bridge from heels w/ext  2R Bridge from toes w/ext 2R Bridge from heels w/ext  2R Bridge from toes w/ext 2R Bridge from heels w/ext  2R Bridge from toes w/ext  2R Bridge from heels w/ext  2R Bridge from toes w/ext      Seated:2R1B row, biceps   1R1B RC, ext                              General ROM Hams,hip add,pirif,lunge, gastroc, soleus Hams,hip add,pirif,lunge     Hams,hip add,pirif,lunge Hams,hip add,pirif,lunge   Standing quad stretch    Hams,hip add,pirif,lunge   Standing quad stretch    Hams,hip add,pirif,lunge   Standing quad stretch    Hams,hip add,pirif,lunge   Standing quad stretch      1R mermaid         Standing quad stretch  1R mermaid                         Other   HEP Bridge, monster walks,

## 2020-01-21 ENCOUNTER — HOSPITAL ENCOUNTER (OUTPATIENT)
Dept: PHYSICAL THERAPY | Age: 50
Discharge: HOME | End: 2020-01-21

## 2020-01-21 NOTE — FLOWSHEET NOTE
[address]  Phone: [phone]  Fax [phone]      Date:  2020    Patient Name:  Wesley Bridges    :  1970  MRN: [id number]  Restrictions/Precautions:    Medical/Treatment Diagnosis Information:  ·  Hist LBP, froz shldr     Physician Information:       Patient is Post-Op [] Yes   [] No     DOS:           12/10/19 12/17/19 12/31/19 2020 2020 2020    Subjective My back is a little sore from working with 8lb [place] ball Been feeling pretty good Feeling good Feeling real good today No changes My left knee feels a little tight today, but overall my back and my shoulder is great              Weeks Post-Op pd 210 12/10/19            1/7  Gap package  No Chg visit     2/7 3/7 4/7 5/7 6/7 7/7   Objective Still working on trunk stab  General strength improvements  Difficulty with scap stab   MMT hamstrings L    - [place]rays Improvements noted in trunk stab and hip disassociation    Improvements noted in trunk stab Still working on trunk stab, good ham flexibility Upper trap tightness, scap elevates with shldr flex Trunk stab improvements noted Still working on trunk stab, good ham flexibility,              Goals Pilates conditioning for spine stab and maintenance Pilates conditioning for spine stab and maintenance Pilates conditioning for spine stab and maintenance Pilates conditioning for spine stab and maintenance Pilates conditioning for spine stab and scap stab maintenance Pilates conditioning for spine stab, scap stab, and maintenance Pilates conditioning for spine stab and maintenance             Reformer Exercises 2R standing leg press  2R1B U calf raise w/str 3R walks, raises w/U str  3R U sq w/domenica  3R plie 3R walks, raises w/U str  3R U sq w/domenica  3R plie    2R1G walks, raises w/U str  2R1G U sq w/domenica  3R plie 2R standing leg press  2R1B U calf raise w/str 2R1G walks, raises w/U str  2R1G U sq w/domenica  2R1G plie   2R1G walks, raises w/U str  2R1G U sq w/domenica 40/40  2R1G plie   Pelvic Stabilization   1R^1B 20/20/30 1R1B stand ab/ad #6 1R^1B #5 20/20/30 1R1B #5 20/20/30 1R1B #5  1R1B                Box: HSC Box: HSC, spine ext   2R 1 arm stand leg press 1R1B 1 arm stand leg [id number] 1 arm stand leg press            Trunk Stabilization 1RTrA variat: 90/90, leg lifts, scissor, knee ext   1R1B 90/90, leg lifts, scissor  1R1B flex, obl 1R1B 90/90, leg lifts, scissor, knee ext  1R1B flex, obl    1R1B 90/90, leg lifts, scissor, knee ext  1R1B flex, obl 1R1B 90/90, leg lifts, scissor, knee ext  1R1B flex, obl  SCAP STAB 1R1B 90/90, leg lifts, scissor, knee ext  1R1B flex, [id number] 90/90, leg lifts, scissor, knee ext  1R1B flex, obl                   1R1B 50sec  1R1B modified knees plank 30 sec                Hip Disassociation 2R1B ll,v,[place],ir/er,frog 2R1B ll,v,[place],ir/er,frog, U w/abd 2R1B ll,v,[place],ir/er,frog 2R1B ll,v,[place],ir/er,frog 2R1B ll,v,[place],ir/er,frog, U w/abd 2R1B ll,v,[place],ir/er,frog, U w/abd 2R1B ll,v,[place],ir/er,frog, U w/abd     1R1B U  arcs          2R stand leg press 1R1B stand leg press 1 arm                  Scapular Stabilization 1R1B seated bi tri, low row, shldr ext 1R1B 1R1B #5 1R1B #6   1R1B 6#      Upper trap stretch Shoulder wall stretch, IR stretch with towel Seated:2R1B row, biceps          1R1B RC, ext               Thoracic Mobility  2R Bridge from heels  2R Bridge from toes 2R Bridge from heels w/ext  2R Bridge from toes w/ext 2R Bridge from heels w/ext  2R Bridge from toes w/ext 2R Bridge from heels w/ext  2R Bridge from toes w/ext 2R Bridge from heels w/ext  2R Bridge U w/domenica 2R Bridge from heels w/ext  2R Bridge from toes w/ext      Seated:2R1B row, biceps   1R1B RC, ext                              General ROM Hams,hip add,pirif,lunge, gastroc, soleus Hams,hip add,pirif,lunge     Hams,hip add,pirif,lunge Hams,hip add,pirif,lunge   Standing quad stretch    Hams,hip add,pirif,lunge   Standing

## 2020-01-28 ENCOUNTER — HOSPITAL ENCOUNTER (OUTPATIENT)
Dept: PHYSICAL THERAPY | Age: 50
Discharge: HOME | End: 2020-01-28

## 2020-01-28 NOTE — FLOWSHEET NOTE
[address]  Phone: [phone]  Fax [phone]      Date:  2020    Patient Name:  Naheed Schafer    :  1970  MRN: [id number]  Restrictions/Precautions:    Medical/Treatment Diagnosis Information:  ·  Hist LBP, froz shldr     Physician Information:       Patient is Post-Op [] Yes   [] No     DOS:           12/10/19 12/17/19 12/31/19 2020 2020 2020 2020   Subjective My back is a little sore from working with 8lb [place] ball Been feeling pretty good Feeling good Feeling real good today No changes My left knee feels a little tight today, but overall my back and my shoulder is great Feeling good today             Weeks Post-Op pd 210 12/10/19            1/7  Gap package  No Chg visit     2/7 3/7 4/7 5/7 6/7 7/7   Objective Still working on trunk stab  General strength improvements  Difficulty with scap stab   MMT hamstrings L 4/5   - [place]rays Improvements noted in trunk stab and hip disassociation    Improvements noted in trunk stab Still working on trunk stab, good ham flexibility Upper trap tightness, scap elevates with shldr flex Trunk stab improvements noted Still working on trunk stab, good ham flexibility,              Goals Pilates conditioning for spine stab and maintenance Pilates conditioning for spine stab and maintenance Pilates conditioning for spine stab and maintenance Pilates conditioning for spine stab and maintenance Pilates conditioning for spine stab and scap stab maintenance Pilates conditioning for spine stab, scap stab, and maintenance Pilates conditioning for spine stab and maintenance             Reformer Exercises 2R standing leg press  2R1B U calf raise w/str 3R walks, raises w/U str  3R U sq w/domenica  3R plie 3R walks, raises w/U str  3R U sq w/domenica  3R plie    2R1G walks, raises w/U str  2R1G U sq w/domenica  3R plie 2R standing leg press  2R1B U calf raise w/str 2R1G walks, raises w/U str  2R1G U sq w/domenica  2R1G plie   2R1G walks, raises w/U str  2R1G U sq w/domenica 40/40  2R1G plie   Pelvic Stabilization   1R^1B 20/20/30 1R1B stand ab/ad #6 1R^1B #5 20/20/30 1R1B #5 20/20/30 1R1B #5  1R1B                Box: HSC Box: HSC, spine ext   2R 1 arm stand leg press 1R1B 1 arm stand leg [id number] 1 arm stand leg press            Trunk Stabilization 1RTrA variat: 90/90, leg lifts, scissor, knee ext   1R1B 90/90, leg lifts, scissor  1R1B flex, obl 1R1B 90/90, leg lifts, scissor, knee ext  1R1B flex, obl    1R1B 90/90, leg lifts, scissor, knee ext  1R1B flex, obl 1R1B 90/90, leg lifts, scissor, knee ext  1R1B flex, obl  SCAP STAB 1R1B 90/90, leg lifts, scissor, knee ext  1R1B flex, [id number] 90/90, leg lifts, scissor, knee ext  1R1B flex, obl                   1R1B 50sec  1R1B modified knees plank 30 sec                Hip Disassociation 2R1B ll,v,[place],ir/er,frog 2R1B ll,v,[place],ir/er,frog, U w/abd 2R1B ll,v,[place],ir/er,frog 2R1B ll,v,[place],ir/er,frog 2R1B ll,v,[place],ir/er,frog, U w/abd 2R1B ll,v,[place],ir/er,frog, U w/abd 2R1B ll,v,[place],ir/er,frog, U w/abd     1R1B U  arcs          2R stand leg press 1R1B stand leg press 1 arm                  Scapular Stabilization 1R1B seated bi tri, low row, shldr ext 1R1B 1R1B #5 1R1B #6   1R1B 6#      Upper trap stretch Shoulder wall stretch, IR stretch with towel Seated:2R1B row, biceps          1R1B RC, ext               Thoracic Mobility  2R Bridge from heels  2R Bridge from toes 2R Bridge from heels w/ext  2R Bridge from toes w/ext 2R Bridge from heels w/ext  2R Bridge from toes w/ext 2R Bridge from heels w/ext  2R Bridge from toes w/ext 2R Bridge from heels w/ext  2R Bridge U w/domenica 2R Bridge from heels w/ext  2R Bridge from toes w/ext      Seated:2R1B row, biceps   1R1B RC, ext                              General ROM Hams,hip add,pirif,lunge, gastroc, soleus Hams,hip add,pirif,lunge     Hams,hip add,pirif,lunge Hams,hip add,pirif,lunge   Standing quad stretch    Hams,hip add,pirif,lunge   Standing quad stretch    Hams,hip add,pirif,lunge   Standing quad stretch    Hams,hip add,pirif,lunge   Standing quad stretch      1R mermaid         Standing quad stretch  1R mermaid 1R mermaid                        Other   HEP Bridge, monster walks, wall sq,  standing quad str HEP Bridge, monster walks, wall sq,  standing quad str OH stretch on wall [employer]: Balance                                  Summary/Comments                                           Electronically signed by:  Gabriel Barber ATC

## 2020-02-04 ENCOUNTER — HOSPITAL ENCOUNTER (OUTPATIENT)
Dept: PHYSICAL THERAPY | Age: 50
Discharge: HOME | End: 2020-02-04

## 2020-02-04 PROCEDURE — 9990000029 HC GAP PACKAGE: Performed by: SPECIALIST/TECHNOLOGIST

## 2020-02-04 NOTE — FLOWSHEET NOTE
[address]  Phone: [phone]  Fax [phone]      Date:  2020    Patient Name:  Johnathan Perez    :  1970  MRN: [id number]  Restrictions/Precautions:    Medical/Treatment Diagnosis Information:  ·  Hist LBP, froz shldr     Physician Information:       Patient is Post-Op [] Yes   [] No     DOS:           2020         Subjective Feeling good today my knee is a little tired                   Weeks Post-Op pd 210 12/10/19  Pd 210 2020            1/7       2/7 3/7 4/7 5/7 6/7 7/7   Objective Still working on trunk stab  General strength improvements  Difficulty with scap stab   MMT hamstrings L    - [place]rays Improvements noted in trunk stab and hip disassociation    Improvements noted in trunk stab Still working on trunk stab, good ham flexibility Upper trap tightness, scap elevates with shldr flex Trunk stab improvements noted Still working on trunk stab, good ham flexibility,              Goals Pilates conditioning for spine stab and maintenance Pilates conditioning for spine stab and maintenance Pilates conditioning for spine stab and maintenance Pilates conditioning for spine stab and maintenance Pilates conditioning for spine stab and scap stab maintenance Pilates conditioning for spine stab, scap stab, and maintenance Pilates conditioning for spine stab and maintenance             Reformer Exercises 2R standing leg press  2R1B U calf raise w/str 3R walks, raises w/U str  3R U sq w/domenica  3R plie 3R walks, raises w/U str  3R U sq w/domenica  3R plie    2R1G walks, raises w/U str  2R1G U sq w/domenica  3R plie 2R standing leg press  2R1B U calf raise w/str 2R1G walks, raises w/U str  2R1G U sq w/domenica  2R1G plie   2R1G walks, raises w/U str  2R1G U sq w/domenica 40/40  2R1G plie   Pelvic Stabilization   1R^1B  1R1B stand ab/ad #6 1R^1B #5  1R1B #5  1R1B #5  1R1B                Box: HSC Box: HSC, spine ext   2R 1 arm stand leg press 1R1B 1 arm stand leg [id number] 1 arm stand leg press            Trunk Stabilization 1RTrA variat: 90/90, leg lifts, scissor, knee ext   1R1B 90/90, leg lifts, scissor  1R1B flex, obl 1R1B 90/90, leg lifts, scissor, knee ext  1R1B flex, obl    1R1B 90/90, leg lifts, scissor, knee ext  1R1B flex, obl 1R1B 90/90, leg lifts, scissor, knee ext  1R1B flex, obl  SCAP STAB 1R1B 90/90, leg lifts, scissor, knee ext  1R1B flex, [id number] 90/90, leg lifts, scissor, knee ext  1R1B flex, obl                   1R1B 50sec  1R1B modified knees plank 30 sec                Hip Disassociation 2R1B ll,v,[place],ir/er,frog 2R1B ll,v,[place],ir/er,frog, U w/abd 2R1B ll,v,[place],ir/er,frog 2R1B ll,v,[place],ir/er,frog 2R1B ll,v,[place],ir/er,frog, U w/abd 2R1B ll,v,[place],ir/er,frog, U w/abd 2R1B ll,v,[place],ir/er,frog, U w/abd     1R1B U  arcs          2R stand leg press 1R1B stand leg press 1 arm                  Scapular Stabilization 1R1B seated bi tri, low row, shldr ext 1R1B 1R1B #5 1R1B #6   1R1B 6#      Upper trap stretch Shoulder wall stretch, IR stretch with towel Seated:2R1B row, biceps          1R1B RC, ext               Thoracic Mobility  2R Bridge from heels  2R Bridge from toes 2R Bridge from heels w/ext  2R Bridge from toes w/ext 2R Bridge from heels w/ext  2R Bridge from toes w/ext 2R Bridge from heels w/ext  2R Bridge from toes w/ext 2R Bridge from heels w/ext  2R Bridge U w/domenica 2R Bridge from heels w/ext  2R Bridge from toes w/ext      Seated:2R1B row, biceps   1R1B RC, ext                              General ROM Hams,hip add,pirif,lunge, gastroc, soleus Hams,hip add,pirif,lunge     Hams,hip add,pirif,lunge Hams,hip add,pirif,lunge   Standing quad stretch    Hams,hip add,pirif,lunge   Standing quad stretch    Hams,hip add,pirif,lunge   Standing quad stretch    Hams,hip add,pirif,lunge   Standing quad stretch      1R mermaid         Standing quad stretch  1R mermaid 1R mermaid                        Other HEP Bridge, monster walks, wall sq,  standing quad str HEP Bridge, monster walks, wall sq,  standing quad str OH stretch on wall [employer]: Balance                                  Summary/Comments                                           Electronically signed by:  Olga Lidia Daniel ATC

## 2020-02-10 ENCOUNTER — HOSPITAL ENCOUNTER (OUTPATIENT)
Dept: PHYSICAL THERAPY | Age: 50
Discharge: HOME | End: 2020-02-10

## 2020-02-10 NOTE — FLOWSHEET NOTE
[address], spine ext   2R 1 arm stand leg press 1R1B 1 arm stand leg [id number] 1 arm stand leg press            Trunk Stabilization 1RTrA variat: 90/90, leg lifts, scissor, knee ext   1R1B 90/90, leg lifts, scissor  1R1B flex, obl 1R1B 90/90, leg lifts, scissor, knee ext  1R1B flex, obl    1R1B 90/90, leg lifts, scissor, knee ext  1R1B flex, obl 1R1B 90/90, leg lifts, scissor, knee ext  1R1B flex, obl  SCAP STAB 1R1B 90/90, leg lifts, scissor, knee ext  1R1B flex, [id number] 90/90, leg lifts, scissor, knee ext  1R1B flex, obl                   1R1B 50sec  1R1B modified knees plank 30 sec                Hip Disassociation 2R1B ll,v,[place],ir/er,frog 2R1B ll,v,[place],ir/er,frog, U w/abd 2R1B ll,v,[place],ir/er,frog 2R1B ll,v,[place],ir/er,frog 2R1B ll,v,[place],ir/er,frog, U w/abd 2R1B ll,v,[place],ir/er,frog, U w/abd 2R1B ll,v,[place],ir/er,frog, U w/abd     1R1B U  arcs          2R stand leg press 1R1B stand leg press 1 arm                  Scapular Stabilization 1R1B seated bi tri, low row, shldr ext 1R1B 1R1B #5 1R1B #6   1R1B 6#      Upper trap stretch Shoulder wall stretch, IR stretch with towel Seated:2R1B row, biceps          1R1B RC, ext               Thoracic Mobility  2R Bridge from heels  2R Bridge from toes 2R Bridge from heels w/ext  2R Bridge from toes w/ext 2R Bridge from heels w/ext  2R Bridge from toes w/ext 2R Bridge from heels w/ext  2R Bridge from toes w/ext 2R Bridge from heels w/ext  2R Bridge U w/domenica 2R Bridge from heels w/ext  2R Bridge from toes w/ext      Seated:2R1B row, biceps   1R1B RC, ext                              General ROM Hams,hip add,pirif,lunge, gastroc, soleus Hams,hip add,pirif,lunge     Hams,hip add,pirif,lunge Hams,hip add,pirif,lunge   Standing quad stretch    Hams,hip add,pirif,lunge   Standing quad stretch    Hams,hip add,pirif,lunge   Standing quad stretch    Hams,hip add,pirif,lunge   Standing quad stretch      1R mermaid         Standing quad stretch  1R mermaid 1R mermaid Other   HEP Bridge, monster walks, wall sq,  standing quad str HEP Bridge, monster walks, wall sq,  standing quad str OH stretch on wall [employer]: Balance                                  Summary/Comments                                           Electronically signed by:  Collin Man ATC

## 2020-02-18 ENCOUNTER — HOSPITAL ENCOUNTER (OUTPATIENT)
Dept: PHYSICAL THERAPY | Age: 50
Discharge: HOME | End: 2020-02-18

## 2020-02-18 NOTE — FLOWSHEET NOTE
[address]  Phone: [phone]  Fax [phone]      Date:  2020    Patient Name:  Natasha Herrera    :  1970  MRN: [id number]  Restrictions/Precautions:    Medical/Treatment Diagnosis Information:  ·  Hist LBP, froz shldr     Physician Information:       Patient is Post-Op [] Yes   [] No     DOS:           2020 2/10/2020 2020       Subjective Feeling good today my knee is a little tired No changes Feeling good                 Weeks Post-Op pd 210 12/10/19  Pd 210 2020            1/7       2/7 3/7 4/7 5/7 6/7 7/7   Objective Still working on trunk stab  General strength improvements  Difficulty with scap stab   MMT hamstrings L    - [place] Improvements noted in trunk stab and hip disassociation    Improvements noted in trunk stab Still working on trunk stab, good ham flexibility Upper trap tightness, scap elevates with shldr flex Trunk stab improvements noted Still working on trunk stab, good ham flexibility,              Goals Pilates conditioning for spine stab and maintenance Pilates conditioning for spine stab and maintenance Pilates conditioning for spine stab and maintenance Pilates conditioning for spine stab and maintenance Pilates conditioning for spine stab and scap stab maintenance Pilates conditioning for spine stab, scap stab, and maintenance Pilates conditioning for spine stab and maintenance             Reformer Exercises 2R standing leg press  2R1B U calf raise w/str 3R walks, raises w/U str  3R U sq w/domenica  3R plie 3R walks, raises w/U str  3R U sq w/domenica  3R plie    2R1G walks, raises w/U str  2R1G U sq w/domenica  3R plie 2R standing leg press  2R1B U calf raise w/str 2R1G walks, raises w/U str  2R1G U sq w/domenica  2R1G plie   2R1G walks, raises w/U str  2R1G U sq w/domenica 40/40  2R1G plie   Pelvic Stabilization   1R^1B  1R1B stand ab/ad #6 1R^1B #5  1R1B #5  1R1B #5  1R1B Box: HSC Box: HSC, spine ext   2R 1 arm stand leg press 1R1B 1 arm stand leg [id number] 1 arm stand leg press            Trunk Stabilization 1RTrA variat: 90/90, leg lifts, scissor, knee ext   1R1B 90/90, leg lifts, scissor  1R1B flex, obl 1R1B 90/90, leg lifts, scissor, knee ext  1R1B flex, obl    1R1B 90/90, leg lifts, scissor, knee ext  1R1B flex, obl 1R1B 90/90, leg lifts, scissor, knee ext  1R1B flex, obl  SCAP STAB 1R1B 90/90, leg lifts, scissor, knee ext  1R1B flex, [id number] 90/90, leg lifts, scissor, knee ext  1R1B flex, obl                   1R1B 50sec  1R1B modified knees plank 30 sec                Hip Disassociation 2R1B ll,v,[place],ir/er,frog 2R1B ll,v,[place],ir/er,frog, U w/abd 2R1B ll,v,False [place],ir/er,frog 2R1B ll,v,False Pass,ir/er,frog 2R1B ll,v,False Pass,ir/er,frog, U w/abd 2R1B ll,v,False [place],ir/er,frog, U w/abd 2R1B ll,v,False Pass,ir/er,frog, U w/abd     1R1B U  arcs          2R stand leg press 1R1B stand leg press 1 arm                  Scapular Stabilization 1R1B seated bi tri, low row, shldr ext 1R1B 1R1B #5 1R1B #6   1R1B 6#      Upper trap stretch Shoulder wall stretch, IR stretch with towel Seated:2R1B row, biceps          1R1B RC, ext               Thoracic Mobility  2R Bridge from heels  2R Bridge from toes 2R Bridge from heels w/ext  2R Bridge from toes w/ext 2R Bridge from heels w/ext  2R Bridge from toes w/ext 2R Bridge from heels w/ext  2R Bridge from toes w/ext 2R Bridge from heels w/ext  2R Bridge U w/domenica 2R Bridge from heels w/ext  2R Bridge from toes w/ext      Seated:2R1B row, biceps   1R1B RC, ext                              General ROM Hams,hip add,pirif,lunge, gastroc, soleus Hams,hip add,pirif,lunge     Hams,hip add,pirif,lunge Hams,hip add,pirif,lunge   Standing quad stretch    Hams,hip add,pirif,lunge   Standing quad stretch    Hams,hip add,pirif,lunge   Standing quad stretch    Hams,hip add,pirif,lunge   Standing quad stretch      1R mermaid         Standing quad stretch  1R mermaid 1R mermaid                        Other   HEP Bridge, monster walks, wall sq,  standing quad str HEP Bridge, monster walks, wall sq,  standing quad str OH stretch on wall Airex: Balance                                  Summary/Comments                                           Electronically signed by:  Geno Barry ATC

## 2020-02-25 ENCOUNTER — HOSPITAL ENCOUNTER (OUTPATIENT)
Dept: PHYSICAL THERAPY | Age: 50
Discharge: HOME | End: 2020-02-25

## 2020-02-25 NOTE — FLOWSHEET NOTE
[address]  Phone: [phone]  Fax [phone]      Date:  2020    Patient Name:  Kamaljit Keller    :  1970  MRN: [id number]  Restrictions/Precautions:    Medical/Treatment Diagnosis Information:  ·  Hist LBP, froz shldr     Physician Information:       Patient is Post-Op [] Yes   [] No     DOS:           2020 2/10/2020 2020 2020      Subjective Feeling good today my knee is a little tired No changes Feeling good No complaints today                Weeks Post-Op pd 210 12/10/19  Pd 210 2020            1/7       2/7 3/7 4/7 5/7 6/7 7/7   Objective Still working on trunk stab  General strength improvements  Difficulty with scap stab   MMT hamstrings L /   - [place] Improvements noted in trunk stab and hip disassociation    Improvements noted in trunk stab Still working on trunk stab, good ham flexibility Upper trap tightness, scap elevates with shldr flex Trunk stab improvements noted Still working on trunk stab, good ham flexibility,              Goals Pilates conditioning for spine stab and maintenance Pilates conditioning for spine stab and maintenance Pilates conditioning for spine stab and maintenance Pilates conditioning for spine stab and maintenance Pilates conditioning for spine stab and scap stab maintenance Pilates conditioning for spine stab, scap stab, and maintenance Pilates conditioning for spine stab and maintenance             Reformer Exercises 2R standing leg press  2R1B U calf raise w/str 3R walks, raises w/U str  3R U sq w/domenica  3R plie 3R walks, raises w/U str  3R U sq w/domenica  3R plie    2R1G walks, raises w/U str  2R1G U sq w/domenica  3R plie 2R standing leg press  2R1B U calf raise w/str 2R1G walks, raises w/U str  2R1G U sq w/domenica  2R1G plie   2R1G walks, raises w/U str  2R1G U sq w/domenica 40/40  2R1G plie   Pelvic Stabilization   1R^1B  1R1B stand ab/ad #6 1R^1B #5  1R1B #5 20/20/30 1R1B #5  1R1B                Box: HSC Box: HSC, spine ext   2R 1 arm stand leg press 1R1B 1 arm stand leg [id number] 1 arm stand leg press            Trunk Stabilization 1RTrA variat: 90/90, leg lifts, scissor, knee ext   1R1B 90/90, leg lifts, scissor  1R1B flex, obl 1R1B 90/90, leg lifts, scissor, knee ext  1R1B flex, obl    1R1B 90/90, leg lifts, scissor, knee ext  1R1B flex, obl 1R1B 90/90, leg lifts, scissor, knee ext  1R1B flex, obl  SCAP STAB 1R1B 90/90, leg lifts, scissor, knee ext  1R1B flex, [id number] 90/90, leg lifts, scissor, knee ext  1R1B flex, obl                   1R1B 50sec  1R1B modified knees plank 30 sec                Hip Disassociation 2R1B ll,v,[place],ir/er,frog 2R1B ll,v,[place],ir/er,frog, U w/abd 2R1B ll,v,[place],ir/er,frog 2R1B ll,v,[place],ir/er,frog 2R1B ll,v,[place],ir/er,frog, U w/abd 2R1B ll,v,[place],ir/er,frog, U w/abd 2R1B ll,v,[place],ir/er,frog, U w/abd     1R1B U  arcs          2R stand leg press 1R1B stand leg press 1 arm                  Scapular Stabilization 1R1B seated bi tri, low row, shldr ext 1R1B 1R1B #5 1R1B #6   1R1B 6#      Upper trap stretch Shoulder wall stretch, IR stretch with towel Seated:2R1B row, biceps          1R1B RC, ext               Thoracic Mobility  2R Bridge from heels  2R Bridge from toes 2R Bridge from heels w/ext  2R Bridge from toes w/ext 2R Bridge from heels w/ext  2R Bridge from toes w/ext 2R Bridge from heels w/ext  2R Bridge from toes w/ext 2R Bridge from heels w/ext  2R Bridge U w/domenica 2R Bridge from heels w/ext  2R Bridge from toes w/ext      Seated:2R1B row, biceps   1R1B RC, ext                              General ROM Hams,hip add,pirif,lunge, gastroc, soleus Hams,hip add,pirif,lunge     Hams,hip add,pirif,lunge Hams,hip add,pirif,lunge   Standing quad stretch    Hams,hip add,pirif,lunge   Standing quad stretch    Hams,hip add,pirif,lunge   Standing quad stretch    Hams,hip add,pirif,lunge   Standing quad stretch      1R mermaid Standing quad stretch  1R mermaid 1R mermaid                        Other   HEP Bridge, monster walks, wall sq,  standing quad str HEP Bridge, monster walks, wall sq,  standing quad str OH stretch on wall Airex: Balance                                  Summary/Comments                                           Electronically signed by:  Kermit Lynn ATC

## 2020-03-17 ENCOUNTER — HOSPITAL ENCOUNTER (OUTPATIENT)
Dept: PHYSICAL THERAPY | Age: 50
Discharge: HOME | End: 2020-03-17

## 2020-03-17 NOTE — FLOWSHEET NOTE
[address], [address] [place] [address]  Phone: [phone]  Fax [phone]      Date:  3/17/2020    Patient Name:  Sari Gruber    :  1970  MRN: [id number]  Restrictions/Precautions:    Medical/Treatment Diagnosis Information:  ·  Hist LBP, froz shldr     Physician Information:       Patient is Post-Op [] Yes   [] No     DOS:           2020 2/10/2020 2020 2020 3/17/2020     Subjective Feeling good today my knee is a little tired No changes Feeling good No complaints today Tweaked my back over my vacation in [place] so I am taking it easy today               Weeks Post-Op pd 210 12/10/19  Pd 210 2020            1/7       2/7 3/7 4/7 5/7 6/7 7/7   Objective Still working on trunk stab  General strength improvements  Difficulty with scap stab   MMT hamstrings L /   - [place] Improvements noted in trunk stab and hip disassociation    Improvements noted in trunk stab Still working on trunk stab, good ham flexibility Upper trap tightness, scap elevates with shldr flex  DC Bridging today Trunk stab improvements noted Still working on trunk stab, good ham flexibility,              Goals Pilates conditioning for spine stab and maintenance Pilates conditioning for spine stab and maintenance Pilates conditioning for spine stab and maintenance Pilates conditioning for spine stab and maintenance Pilates conditioning for spine stab and scap stab maintenance Pilates conditioning for spine stab, scap stab, and maintenance Pilates conditioning for spine stab and maintenance             Reformer Exercises 2R standing leg press  2R1B U calf raise w/str 3R walks, raises w/U str  3R U sq w/domenica  3R plie 3R walks, raises w/U str  3R U sq w/domenica  3R plie    2R1G walks, raises w/U str  2R1G U sq w/domenica  3R plie 3R walks, raises  3R U sq w/domenica    3R standing leg press  2R1B U calf raise w/str 2R1G walks, raises w/U str  2R1G U sq w/domenica  2R1G plie   2R1G walks, raises w/U str  2R1G U sq w/domenica 40/40  2R1G plie   Pelvic Stabilization   1R^1B 20/20/30 1R1B stand ab/ad #6 1R^1B #5 20/20/30 1R1B #5 20/20/30 1R1B #5  1R1B                Box: HSC Box: HSC, spine ext   2R 1 arm stand leg press 1R1B 1 arm stand leg [id number] 1 arm stand leg press            Trunk Stabilization 1RTrA variat: 90/90, leg lifts, scissor, knee ext   1R1B 90/90, leg lifts, scissor  1R1B flex, obl 1R1B 90/90, leg lifts, scissor, knee ext  1R1B flex, obl    1R1B 90/90, leg lifts, scissor, knee ext  1R1B flex, obl 1R1B 90/90, leg lifts, scissor, knee ext  1R1B flex, obl  SCAP STAB 1R1B 90/90, leg lifts, scissor, knee ext  1R1B flex, [id number] 90/90, leg lifts, scissor, knee ext  1R1B flex, obl                   1R1B 50sec  1R1B modified knees plank 30 sec                Hip Disassociation 2R1B ll,v,[place],ir/er,frog 2R1B ll,v,[place],ir/er,frog, U w/abd 2R1B ll,v,[place],ir/er,frog 2R1B ll,v,[place],ir/er,frog 2R1B ll,v,[place],ir/er,frog, U w/abd 2R1B ll,v,[place],ir/er,frog, U w/abd 2R1B ll,v,[place],ir/er,frog, U w/abd     1R1B U  arcs          2R stand leg press 1R1B stand leg press 1 arm                  Scapular Stabilization 1R1B seated bi tri, low row, shldr ext 1R1B 1R1B #5 1R1B #6   1R1B 6#      Upper trap stretch Shoulder wall stretch, IR stretch with towel Seated:2R1B row, biceps          1R1B RC, ext               Thoracic Mobility  2R Bridge from heels  2R Bridge from toes 2R Bridge from heels w/ext  2R Bridge from toes w/ext 2R Bridge from heels w/ext  2R Bridge from toes w/ext 2R Bridge from heels w/ext  2R Bridge from toes w/ext 2R Bridge from heels w/ext  2R Bridge U w/domenica 2R Bridge from heels w/ext  2R Bridge from toes w/ext      Seated:2R1B row, biceps   1R1B RC, ext                              General ROM Hams,hip add,pirif,lunge, gastroc, soleus Hams,hip add,pirif,lunge     Hams,hip add,pirif,lunge Hams,hip add,pirif,lunge   Standing quad stretch    Hams,hip add,pirif,lunge   Standing quad stretch

## 2020-08-25 ENCOUNTER — TELEPHONE (OUTPATIENT)
Dept: ORTHOPEDIC SURGERY | Age: 50
End: 2020-08-25

## 2020-08-25 ENCOUNTER — OFFICE VISIT (OUTPATIENT)
Dept: ORTHOPEDIC SURGERY | Age: 50
End: 2020-08-25
Payer: COMMERCIAL

## 2020-08-25 VITALS — BODY MASS INDEX: 25.03 KG/M2 | HEIGHT: 69 IN | WEIGHT: 169 LBS

## 2020-08-25 PROCEDURE — L4361 PNEUMA/VAC WALK BOOT PRE OTS: HCPCS | Performed by: PODIATRIST

## 2020-08-25 PROCEDURE — 99203 OFFICE O/P NEW LOW 30 MIN: CPT | Performed by: PODIATRIST

## 2020-08-25 NOTE — PROGRESS NOTES
HISTORY OF PRESENT ILLNESS:  This is an initial visit for a 69-year-old female with a chief complaint of pain to the back of the left heel. This began suddenly a couple days ago. She cannot recall any direct injury. The pain is fairly sharp and described as burning at times with both activity and direct pressure. The only real relief is experienced with rest and taking off shoes. She also complains of pain on the bottoms of both heels over the last several months. That pain comes and goes. FAMILY HISTORY: Documented in chart. SOCIAL HISTORY: Documented in chart. REVIEW OF SYSTEMS: The patient denies any fever, chills, or night sweats. The patient also denies developing any type of rash. The patient denies any problems with cardiovascular, pulmonary, gastrointestinal, neurologic, urologic, genitourinary, psychiatric, dermatologic, and HEENT systems. PHYSICAL EXAM:  The majority of the palpable tenderness is at the posterior central aspect of the left posterior heel at the insertion of the Achilles tendon. There is no pain to the right heel. There is a small prominence at the posterior aspect of the left heel with some mild erythema. There is minimal pain of the Achilles tendon itself. There is no palpable deficit and Baez's test is negative for a complete rupture. There is full-strength with plantar flexion at the ankle. She has mild palpable tenderness at the plantar medial aspect of the right and left heels. There is no pain with side-to-side compression of either heel. Pedal pulses are palpable, bilateral.  The sensation is grossly intact, bilateral.    X-RAYS: 2 weightbearing views of the left heel were obtained. No acute fracture or periosteal reaction is noted. 2 weightbearing views of the right heel were also taken. No acute fracture or periosteal reaction is noted. ASSESSMENT:  Insertional Achilles Tendinitis/Retrocalcaneal Bursitis, left.   Plantar fasciitis, bilateral.      PLAN:  I educated the patient on the pathology and its treatment options. The patient was immobilized with a high tide walker on the left side. The boot is to be used at all times while walking. Walking will be allowed as tolerated but overall activity should be decreased. The importance of rest in this condition was emphasized. The chronic and recurrent nature of the painful episodes with this condition was discussed. Both short-term and long-term treatments were discussed. At this point, I only recommend a trial of conservative treatment and the patient agrees with the treatment plan. I will see them back in approximately 3 weeks. Procedures    Airselect Tall Pneumatic Walking Boot     Patient was prescribed a Oneda [place] Tall Walking Boot. The left foot   will require stabilization / immobilization from this semi-rigid / rigid orthosis to improve their function. The orthosis will assist in protecting the affected area, provide functional support and facilitate healing. Patient was instructed to progress ambulation weight bearing as tolerated in the device. The patient was educated and fit by a healthcare professional with expert knowledge and specialization in brace application while under the direct supervision of the physician. Verbal and written instructions for the use of and application of this item were provided. They were instructed to contact the office immediately should the brace result in increased pain, decreased sensation, increased swelling or worsening of the condition.

## 2020-08-25 NOTE — TELEPHONE ENCOUNTER
[place] [facility] [place] PRECERT REQUIRED - PER  MIGUEL A @ [facility]   REF # [id number]  MP

## 2020-09-15 ENCOUNTER — OFFICE VISIT (OUTPATIENT)
Dept: ORTHOPEDIC SURGERY | Age: 50
End: 2020-09-15
Payer: COMMERCIAL

## 2020-09-15 VITALS — WEIGHT: 169 LBS | BODY MASS INDEX: 25.03 KG/M2 | HEIGHT: 69 IN

## 2020-09-15 PROCEDURE — L3040 FT ARCH SUPRT PREMOLD LONGIT: HCPCS | Performed by: PODIATRIST

## 2020-09-15 PROCEDURE — 99213 OFFICE O/P EST LOW 20 MIN: CPT | Performed by: PODIATRIST

## 2020-09-15 RX ORDER — PREDNISONE 10 MG/1
TABLET ORAL
Qty: 26 TABLET | Refills: 0 | Status: SHIPPED | OUTPATIENT
Start: 2020-09-15 | End: 2020-09-29

## 2020-09-15 NOTE — PROGRESS NOTES
HISTORY OF PRESENT ILLNESS:  This is a return visit for a patient with a chief complaint of pain to the back of the left heel. The pain is greater than 50% better at the back of her heel however, she states that she is having more pain on the bottom of her heel now. She stopped using the boot a couple days ago. PHYSICAL EXAM:  The majority of the palpable tenderness is at the plantar medial aspect of the left and right heels. She has mild palpable tenderness at the insertion of the Achilles tendon at the left posterior heel. There is a small prominence at the posterior aspect of the left heel with some mild erythema. There is minimal pain of the Achilles tendon itself. There is no palpable deficit and Baez's test is negative for a complete rupture. There is full-strength with plantar flexion at the ankle. This is symmetrical.    Pedal pulses are palpable, bilateral.  The sensation is grossly intact, bilateral.      ASSESSMENT:  Insertional Achilles Tendinitis/Retrocalcaneal Bursitis, left. Plantar fasciitis, bilateral.      PLAN: I like her to stay in the boot on the left side for another week. I called in a prescription for prednisone 10 mg taper. We discussed activity modification again. I will see her back in 2 weeks. A set of PowerStep foot orthotics was dispensed. We discussed the appropriate use of them. I advised the patient to use them full time in a supportive shoe that will fit them. Procedures    Powerstep Protech Full Length Insert     Patient was prescribed Powerstep Protech Full Length Inserts. The bilateral FOOT will require stabilization / support from this semi-rigid / rigid orthosis to improve their function. The orthosis will assist in protecting the affected area, provide functional support and facilitate healing.     The patient was educated and fit by a healthcare professional with expert knowledge and specialization in brace application while under the direct supervision of the treating physician. Verbal and written instructions for the use of and application of this item were provided. They were instructed to contact the office immediately should the brace result in increased pain, decreased sensation, increased swelling or worsening of the condition.

## 2020-09-18 ENCOUNTER — TELEPHONE (OUTPATIENT)
Dept: ORTHOPEDIC SURGERY | Age: 50
End: 2020-09-18

## 2020-09-18 NOTE — TELEPHONE ENCOUNTER
PATIENT CALLED AND STATED THAT SHE HAS TAKEN HER MEDICATION INCORRECTLY FOR THE LAST 2 DAYS AND SHE WANTED TO TALK TO SOME ONE IN DR. Brooklyn Kerr OFFICE.  [phone]

## 2020-09-29 ENCOUNTER — OFFICE VISIT (OUTPATIENT)
Dept: ORTHOPEDIC SURGERY | Age: 50
End: 2020-09-29
Payer: COMMERCIAL

## 2020-09-29 VITALS — HEIGHT: 69 IN | BODY MASS INDEX: 25.03 KG/M2 | WEIGHT: 169 LBS

## 2020-09-29 PROBLEM — Z12.11 COLON CANCER SCREENING: Status: ACTIVE | Noted: 2020-09-29

## 2020-09-29 PROCEDURE — 99213 OFFICE O/P EST LOW 20 MIN: CPT | Performed by: PODIATRIST

## 2020-10-29 PROBLEM — Z12.11 COLON CANCER SCREENING: Status: RESOLVED | Noted: 2020-09-29 | Resolved: 2020-10-29
